# Patient Record
Sex: FEMALE | Race: OTHER | NOT HISPANIC OR LATINO | URBAN - METROPOLITAN AREA
[De-identification: names, ages, dates, MRNs, and addresses within clinical notes are randomized per-mention and may not be internally consistent; named-entity substitution may affect disease eponyms.]

---

## 2018-04-24 ENCOUNTER — OUTPATIENT (OUTPATIENT)
Dept: OUTPATIENT SERVICES | Facility: HOSPITAL | Age: 23
LOS: 1 days | End: 2018-04-24
Payer: MEDICAID

## 2018-04-24 ENCOUNTER — EMERGENCY (EMERGENCY)
Facility: HOSPITAL | Age: 23
LOS: 1 days | Discharge: LEFT BEFORE DISCHARGE | End: 2018-04-24
Attending: EMERGENCY MEDICINE
Payer: MEDICAID

## 2018-04-24 VITALS
OXYGEN SATURATION: 99 % | WEIGHT: 111.99 LBS | HEIGHT: 59 IN | RESPIRATION RATE: 16 BRPM | SYSTOLIC BLOOD PRESSURE: 113 MMHG | HEART RATE: 100 BPM | TEMPERATURE: 99 F | DIASTOLIC BLOOD PRESSURE: 72 MMHG

## 2018-04-24 DIAGNOSIS — O26.899 OTHER SPECIFIED PREGNANCY RELATED CONDITIONS, UNSPECIFIED TRIMESTER: ICD-10-CM

## 2018-04-24 DIAGNOSIS — Z3A.00 WEEKS OF GESTATION OF PREGNANCY NOT SPECIFIED: ICD-10-CM

## 2018-04-24 PROCEDURE — 99281 EMR DPT VST MAYX REQ PHY/QHP: CPT

## 2018-04-24 PROCEDURE — 59025 FETAL NON-STRESS TEST: CPT

## 2018-04-24 PROCEDURE — G0463: CPT

## 2018-04-24 NOTE — ED ADULT NURSE NOTE - OBJECTIVE STATEMENT
22 y/o female with c/o abdominal pain was evaluated in L & D and was sent here for evaluation and clearance pt . looks fairly comfortbale with  no signs of any pain or discomfort.

## 2018-04-24 NOTE — ED PROVIDER NOTE - OBJECTIVE STATEMENT
Pt. present to ED with lower abdominal pain. Pt. was seen at L&D and was then sent back to the ED to be evaluated. Pt. was upset about the wait time. I started to get a history from the patient, but she then decided to get up and leave. I could not convince the patient to stay and be evaluated.

## 2018-04-24 NOTE — ED ADULT NURSE NOTE - NS ED NURSE ELOPE COMMENTS
pt spoke with DR Nunez but after evaluation pt decide to leave and just go home MD  convinced pt to stay but pt refused and just left

## 2018-06-19 PROBLEM — Z00.00 ENCOUNTER FOR PREVENTIVE HEALTH EXAMINATION: Status: ACTIVE | Noted: 2018-06-19

## 2018-06-26 ENCOUNTER — APPOINTMENT (OUTPATIENT)
Dept: CARDIOLOGY | Facility: CLINIC | Age: 23
End: 2018-06-26

## 2018-09-01 ENCOUNTER — OUTPATIENT (OUTPATIENT)
Dept: OUTPATIENT SERVICES | Facility: HOSPITAL | Age: 23
LOS: 1 days | End: 2018-09-01
Payer: MEDICAID

## 2018-09-01 DIAGNOSIS — O26.899 OTHER SPECIFIED PREGNANCY RELATED CONDITIONS, UNSPECIFIED TRIMESTER: ICD-10-CM

## 2018-09-01 DIAGNOSIS — Z3A.00 WEEKS OF GESTATION OF PREGNANCY NOT SPECIFIED: ICD-10-CM

## 2018-09-01 LAB
ALBUMIN SERPL ELPH-MCNC: 2.2 G/DL — LOW (ref 3.5–5)
ALP SERPL-CCNC: 125 U/L — HIGH (ref 40–120)
ALT FLD-CCNC: 13 U/L DA — SIGNIFICANT CHANGE UP (ref 10–60)
ANION GAP SERPL CALC-SCNC: 9 MMOL/L — SIGNIFICANT CHANGE UP (ref 5–17)
AST SERPL-CCNC: 12 U/L — SIGNIFICANT CHANGE UP (ref 10–40)
BILIRUB SERPL-MCNC: 0.3 MG/DL — SIGNIFICANT CHANGE UP (ref 0.2–1.2)
BUN SERPL-MCNC: 9 MG/DL — SIGNIFICANT CHANGE UP (ref 7–18)
CALCIUM SERPL-MCNC: 8.1 MG/DL — LOW (ref 8.4–10.5)
CHLORIDE SERPL-SCNC: 109 MMOL/L — HIGH (ref 96–108)
CO2 SERPL-SCNC: 23 MMOL/L — SIGNIFICANT CHANGE UP (ref 22–31)
CREAT SERPL-MCNC: 0.44 MG/DL — LOW (ref 0.5–1.3)
GLUCOSE SERPL-MCNC: 89 MG/DL — SIGNIFICANT CHANGE UP (ref 70–99)
POTASSIUM SERPL-MCNC: 3.7 MMOL/L — SIGNIFICANT CHANGE UP (ref 3.5–5.3)
POTASSIUM SERPL-SCNC: 3.7 MMOL/L — SIGNIFICANT CHANGE UP (ref 3.5–5.3)
PROT SERPL-MCNC: 6.6 G/DL — SIGNIFICANT CHANGE UP (ref 6–8.3)
SODIUM SERPL-SCNC: 141 MMOL/L — SIGNIFICANT CHANGE UP (ref 135–145)

## 2018-09-01 PROCEDURE — 82239 BILE ACIDS TOTAL: CPT

## 2018-09-01 PROCEDURE — 99281 EMR DPT VST MAYX REQ PHY/QHP: CPT

## 2018-09-01 PROCEDURE — G0463: CPT

## 2018-09-01 PROCEDURE — 59025 FETAL NON-STRESS TEST: CPT

## 2018-09-01 PROCEDURE — 80053 COMPREHEN METABOLIC PANEL: CPT

## 2018-09-05 LAB — BILE AC FLD-MCNC: 5.3 UMOL/L — SIGNIFICANT CHANGE UP (ref 4.7–24.5)

## 2018-09-14 ENCOUNTER — OUTPATIENT (OUTPATIENT)
Dept: OUTPATIENT SERVICES | Facility: HOSPITAL | Age: 23
LOS: 1 days | End: 2018-09-14
Payer: MEDICAID

## 2018-09-14 DIAGNOSIS — Z3A.00 WEEKS OF GESTATION OF PREGNANCY NOT SPECIFIED: ICD-10-CM

## 2018-09-14 DIAGNOSIS — O26.899 OTHER SPECIFIED PREGNANCY RELATED CONDITIONS, UNSPECIFIED TRIMESTER: ICD-10-CM

## 2018-09-14 PROCEDURE — 99282 EMERGENCY DEPT VISIT SF MDM: CPT

## 2018-09-14 PROCEDURE — G0463: CPT

## 2018-09-14 PROCEDURE — 59025 FETAL NON-STRESS TEST: CPT

## 2018-09-15 ENCOUNTER — RESULT REVIEW (OUTPATIENT)
Age: 23
End: 2018-09-15

## 2018-09-15 ENCOUNTER — INPATIENT (INPATIENT)
Facility: HOSPITAL | Age: 23
LOS: 1 days | Discharge: HOME | End: 2018-09-17
Attending: OBSTETRICS & GYNECOLOGY | Admitting: OBSTETRICS & GYNECOLOGY
Payer: MEDICAID

## 2018-09-15 VITALS — WEIGHT: 143.3 LBS

## 2018-09-15 DIAGNOSIS — O26.899 OTHER SPECIFIED PREGNANCY RELATED CONDITIONS, UNSPECIFIED TRIMESTER: ICD-10-CM

## 2018-09-15 DIAGNOSIS — Z3A.00 WEEKS OF GESTATION OF PREGNANCY NOT SPECIFIED: ICD-10-CM

## 2018-09-15 DIAGNOSIS — Z34.80 ENCOUNTER FOR SUPERVISION OF OTHER NORMAL PREGNANCY, UNSPECIFIED TRIMESTER: ICD-10-CM

## 2018-09-15 LAB
APTT BLD: 38.6 SEC — HIGH (ref 27.5–37.4)
BASOPHILS # BLD AUTO: 0.1 K/UL — SIGNIFICANT CHANGE UP (ref 0–0.2)
BASOPHILS NFR BLD AUTO: 0.5 % — SIGNIFICANT CHANGE UP (ref 0–2)
BLD GP AB SCN SERPL QL: SIGNIFICANT CHANGE UP
EOSINOPHIL # BLD AUTO: 0 K/UL — SIGNIFICANT CHANGE UP (ref 0–0.5)
EOSINOPHIL NFR BLD AUTO: 0.1 % — SIGNIFICANT CHANGE UP (ref 0–6)
FIBRINOGEN PPP-MCNC: 936 MG/DL — HIGH (ref 310–510)
HCT VFR BLD CALC: 37.1 % — SIGNIFICANT CHANGE UP (ref 34.5–45)
HGB BLD-MCNC: 11.9 G/DL — SIGNIFICANT CHANGE UP (ref 11.5–15.5)
INR BLD: 1.13 RATIO — SIGNIFICANT CHANGE UP (ref 0.88–1.16)
LYMPHOCYTES # BLD AUTO: 1.6 K/UL — SIGNIFICANT CHANGE UP (ref 1–3.3)
LYMPHOCYTES # BLD AUTO: 11.3 % — LOW (ref 13–44)
MCHC RBC-ENTMCNC: 27.8 PG — SIGNIFICANT CHANGE UP (ref 27–34)
MCHC RBC-ENTMCNC: 32.1 GM/DL — SIGNIFICANT CHANGE UP (ref 32–36)
MCV RBC AUTO: 86.7 FL — SIGNIFICANT CHANGE UP (ref 80–100)
MONOCYTES # BLD AUTO: 0.4 K/UL — SIGNIFICANT CHANGE UP (ref 0–0.9)
MONOCYTES NFR BLD AUTO: 2.4 % — SIGNIFICANT CHANGE UP (ref 2–14)
NEUTROPHILS # BLD AUTO: 12.4 K/UL — HIGH (ref 1.8–7.4)
NEUTROPHILS NFR BLD AUTO: 85.7 % — HIGH (ref 43–77)
PLATELET # BLD AUTO: 323 K/UL — SIGNIFICANT CHANGE UP (ref 150–400)
PROTHROM AB SERPL-ACNC: 12.3 SEC — SIGNIFICANT CHANGE UP (ref 9.8–12.7)
RBC # BLD: 4.28 M/UL — SIGNIFICANT CHANGE UP (ref 3.8–5.2)
RBC # FLD: 12.1 % — SIGNIFICANT CHANGE UP (ref 10.3–14.5)
WBC # BLD: 14.5 K/UL — HIGH (ref 3.8–10.5)
WBC # FLD AUTO: 14.5 K/UL — HIGH (ref 3.8–10.5)

## 2018-09-15 PROCEDURE — 88307 TISSUE EXAM BY PATHOLOGIST: CPT | Mod: 26

## 2018-09-15 RX ORDER — SODIUM CHLORIDE 9 MG/ML
1000 INJECTION, SOLUTION INTRAVENOUS
Qty: 0 | Refills: 0 | Status: DISCONTINUED | OUTPATIENT
Start: 2018-09-15 | End: 2018-09-15

## 2018-09-15 RX ORDER — DIBUCAINE 1 %
1 OINTMENT (GRAM) RECTAL EVERY 4 HOURS
Qty: 0 | Refills: 0 | Status: DISCONTINUED | OUTPATIENT
Start: 2018-09-15 | End: 2018-09-17

## 2018-09-15 RX ORDER — PENICILLIN G POTASSIUM 5000000 [IU]/1
POWDER, FOR SOLUTION INTRAMUSCULAR; INTRAPLEURAL; INTRATHECAL; INTRAVENOUS
Qty: 0 | Refills: 0 | Status: DISCONTINUED | OUTPATIENT
Start: 2018-09-15 | End: 2018-09-15

## 2018-09-15 RX ORDER — LANOLIN
1 OINTMENT (GRAM) TOPICAL EVERY 6 HOURS
Qty: 0 | Refills: 0 | Status: DISCONTINUED | OUTPATIENT
Start: 2018-09-15 | End: 2018-09-17

## 2018-09-15 RX ORDER — PENICILLIN G POTASSIUM 5000000 [IU]/1
2.5 POWDER, FOR SOLUTION INTRAMUSCULAR; INTRAPLEURAL; INTRATHECAL; INTRAVENOUS EVERY 4 HOURS
Qty: 0 | Refills: 0 | Status: DISCONTINUED | OUTPATIENT
Start: 2018-09-15 | End: 2018-09-15

## 2018-09-15 RX ORDER — DIPHENHYDRAMINE HCL 50 MG
25 CAPSULE ORAL EVERY 6 HOURS
Qty: 0 | Refills: 0 | Status: DISCONTINUED | OUTPATIENT
Start: 2018-09-15 | End: 2018-09-17

## 2018-09-15 RX ORDER — MAGNESIUM HYDROXIDE 400 MG/1
30 TABLET, CHEWABLE ORAL
Qty: 0 | Refills: 0 | Status: DISCONTINUED | OUTPATIENT
Start: 2018-09-15 | End: 2018-09-17

## 2018-09-15 RX ORDER — SODIUM CHLORIDE 9 MG/ML
1000 INJECTION, SOLUTION INTRAVENOUS ONCE
Qty: 0 | Refills: 0 | Status: COMPLETED | OUTPATIENT
Start: 2018-09-15 | End: 2018-09-15

## 2018-09-15 RX ORDER — CITRIC ACID/SODIUM CITRATE 300-500 MG
15 SOLUTION, ORAL ORAL EVERY 4 HOURS
Qty: 0 | Refills: 0 | Status: DISCONTINUED | OUTPATIENT
Start: 2018-09-15 | End: 2018-09-15

## 2018-09-15 RX ORDER — INFLUENZA VIRUS VACCINE 15; 15; 15; 15 UG/.5ML; UG/.5ML; UG/.5ML; UG/.5ML
0.5 SUSPENSION INTRAMUSCULAR ONCE
Qty: 0 | Refills: 0 | Status: DISCONTINUED | OUTPATIENT
Start: 2018-09-15 | End: 2018-09-17

## 2018-09-15 RX ORDER — OXYTOCIN 10 UNIT/ML
41.67 VIAL (ML) INJECTION
Qty: 20 | Refills: 0 | Status: DISCONTINUED | OUTPATIENT
Start: 2018-09-15 | End: 2018-09-17

## 2018-09-15 RX ORDER — OXYTOCIN 10 UNIT/ML
333.33 VIAL (ML) INJECTION
Qty: 20 | Refills: 0 | Status: DISCONTINUED | OUTPATIENT
Start: 2018-09-15 | End: 2018-09-15

## 2018-09-15 RX ORDER — AER TRAVELER 0.5 G/1
1 SOLUTION RECTAL; TOPICAL EVERY 4 HOURS
Qty: 0 | Refills: 0 | Status: DISCONTINUED | OUTPATIENT
Start: 2018-09-15 | End: 2018-09-17

## 2018-09-15 RX ORDER — OXYCODONE AND ACETAMINOPHEN 5; 325 MG/1; MG/1
1 TABLET ORAL
Qty: 0 | Refills: 0 | Status: DISCONTINUED | OUTPATIENT
Start: 2018-09-15 | End: 2018-09-17

## 2018-09-15 RX ORDER — DOCUSATE SODIUM 100 MG
100 CAPSULE ORAL
Qty: 0 | Refills: 0 | Status: DISCONTINUED | OUTPATIENT
Start: 2018-09-15 | End: 2018-09-17

## 2018-09-15 RX ORDER — PENICILLIN G POTASSIUM 5000000 [IU]/1
5 POWDER, FOR SOLUTION INTRAMUSCULAR; INTRAPLEURAL; INTRATHECAL; INTRAVENOUS ONCE
Qty: 0 | Refills: 0 | Status: COMPLETED | OUTPATIENT
Start: 2018-09-15 | End: 2018-09-15

## 2018-09-15 RX ORDER — IBUPROFEN 200 MG
600 TABLET ORAL EVERY 6 HOURS
Qty: 0 | Refills: 0 | Status: DISCONTINUED | OUTPATIENT
Start: 2018-09-15 | End: 2018-09-17

## 2018-09-15 RX ORDER — GLYCERIN ADULT
1 SUPPOSITORY, RECTAL RECTAL AT BEDTIME
Qty: 0 | Refills: 0 | Status: DISCONTINUED | OUTPATIENT
Start: 2018-09-15 | End: 2018-09-17

## 2018-09-15 RX ORDER — BENZOCAINE 10 %
1 GEL (GRAM) MUCOUS MEMBRANE EVERY 6 HOURS
Qty: 0 | Refills: 0 | Status: DISCONTINUED | OUTPATIENT
Start: 2018-09-15 | End: 2018-09-17

## 2018-09-15 RX ORDER — TETANUS TOXOID, REDUCED DIPHTHERIA TOXOID AND ACELLULAR PERTUSSIS VACCINE, ADSORBED 5; 2.5; 8; 8; 2.5 [IU]/.5ML; [IU]/.5ML; UG/.5ML; UG/.5ML; UG/.5ML
0.5 SUSPENSION INTRAMUSCULAR ONCE
Qty: 0 | Refills: 0 | Status: COMPLETED | OUTPATIENT
Start: 2018-09-15 | End: 2019-08-14

## 2018-09-15 RX ORDER — HYDROCORTISONE 1 %
1 OINTMENT (GRAM) TOPICAL EVERY 4 HOURS
Qty: 0 | Refills: 0 | Status: DISCONTINUED | OUTPATIENT
Start: 2018-09-15 | End: 2018-09-17

## 2018-09-15 RX ORDER — ACETAMINOPHEN 500 MG
650 TABLET ORAL EVERY 6 HOURS
Qty: 0 | Refills: 0 | Status: DISCONTINUED | OUTPATIENT
Start: 2018-09-15 | End: 2018-09-17

## 2018-09-15 RX ORDER — SODIUM CHLORIDE 9 MG/ML
3 INJECTION INTRAMUSCULAR; INTRAVENOUS; SUBCUTANEOUS EVERY 8 HOURS
Qty: 0 | Refills: 0 | Status: DISCONTINUED | OUTPATIENT
Start: 2018-09-15 | End: 2018-09-17

## 2018-09-15 RX ORDER — SIMETHICONE 80 MG/1
80 TABLET, CHEWABLE ORAL EVERY 6 HOURS
Qty: 0 | Refills: 0 | Status: DISCONTINUED | OUTPATIENT
Start: 2018-09-15 | End: 2018-09-17

## 2018-09-15 RX ORDER — BUTORPHANOL TARTRATE 2 MG/ML
2 INJECTION, SOLUTION INTRAMUSCULAR; INTRAVENOUS ONCE
Qty: 0 | Refills: 0 | Status: DISCONTINUED | OUTPATIENT
Start: 2018-09-15 | End: 2018-09-15

## 2018-09-15 RX ORDER — OXYTOCIN 10 UNIT/ML
2 VIAL (ML) INJECTION
Qty: 30 | Refills: 0 | Status: DISCONTINUED | OUTPATIENT
Start: 2018-09-15 | End: 2018-09-15

## 2018-09-15 RX ORDER — OXYCODONE AND ACETAMINOPHEN 5; 325 MG/1; MG/1
2 TABLET ORAL EVERY 6 HOURS
Qty: 0 | Refills: 0 | Status: DISCONTINUED | OUTPATIENT
Start: 2018-09-15 | End: 2018-09-17

## 2018-09-15 RX ORDER — PRAMOXINE HYDROCHLORIDE 150 MG/15G
1 AEROSOL, FOAM RECTAL EVERY 4 HOURS
Qty: 0 | Refills: 0 | Status: DISCONTINUED | OUTPATIENT
Start: 2018-09-15 | End: 2018-09-17

## 2018-09-15 RX ADMIN — PENICILLIN G POTASSIUM 200 MILLION UNIT(S): 5000000 POWDER, FOR SOLUTION INTRAMUSCULAR; INTRAPLEURAL; INTRATHECAL; INTRAVENOUS at 13:26

## 2018-09-15 RX ADMIN — PENICILLIN G POTASSIUM 200 MILLION UNIT(S): 5000000 POWDER, FOR SOLUTION INTRAMUSCULAR; INTRAPLEURAL; INTRATHECAL; INTRAVENOUS at 04:00

## 2018-09-15 RX ADMIN — BUTORPHANOL TARTRATE 2 MILLIGRAM(S): 2 INJECTION, SOLUTION INTRAMUSCULAR; INTRAVENOUS at 09:55

## 2018-09-15 RX ADMIN — SODIUM CHLORIDE 125 MILLILITER(S): 9 INJECTION, SOLUTION INTRAVENOUS at 12:53

## 2018-09-15 RX ADMIN — SODIUM CHLORIDE 125 MILLILITER(S): 9 INJECTION, SOLUTION INTRAVENOUS at 07:18

## 2018-09-15 RX ADMIN — Medication 125 MILLIUNIT(S)/MIN: at 15:25

## 2018-09-15 RX ADMIN — PENICILLIN G POTASSIUM 200 MILLION UNIT(S): 5000000 POWDER, FOR SOLUTION INTRAMUSCULAR; INTRAPLEURAL; INTRATHECAL; INTRAVENOUS at 08:00

## 2018-09-15 RX ADMIN — SODIUM CHLORIDE 3 MILLILITER(S): 9 INJECTION INTRAMUSCULAR; INTRAVENOUS; SUBCUTANEOUS at 22:57

## 2018-09-15 RX ADMIN — Medication 25 MILLIGRAM(S): at 09:59

## 2018-09-15 RX ADMIN — Medication 2 MILLIUNIT(S)/MIN: at 14:07

## 2018-09-15 RX ADMIN — OXYCODONE AND ACETAMINOPHEN 2 TABLET(S): 5; 325 TABLET ORAL at 17:51

## 2018-09-15 RX ADMIN — BUTORPHANOL TARTRATE 2 MILLIGRAM(S): 2 INJECTION, SOLUTION INTRAMUSCULAR; INTRAVENOUS at 10:11

## 2018-09-15 RX ADMIN — OXYCODONE AND ACETAMINOPHEN 2 TABLET(S): 5; 325 TABLET ORAL at 17:28

## 2018-09-15 RX ADMIN — SODIUM CHLORIDE 2000 MILLILITER(S): 9 INJECTION, SOLUTION INTRAVENOUS at 03:55

## 2018-09-16 ENCOUNTER — TRANSCRIPTION ENCOUNTER (OUTPATIENT)
Age: 23
End: 2018-09-16

## 2018-09-16 DIAGNOSIS — D50.0 IRON DEFICIENCY ANEMIA SECONDARY TO BLOOD LOSS (CHRONIC): ICD-10-CM

## 2018-09-16 LAB
BASOPHILS # BLD AUTO: 0.1 K/UL — SIGNIFICANT CHANGE UP (ref 0–0.2)
BASOPHILS NFR BLD AUTO: 0.7 % — SIGNIFICANT CHANGE UP (ref 0–2)
EOSINOPHIL # BLD AUTO: 0.1 K/UL — SIGNIFICANT CHANGE UP (ref 0–0.5)
EOSINOPHIL NFR BLD AUTO: 0.9 % — SIGNIFICANT CHANGE UP (ref 0–6)
HCT VFR BLD CALC: 32.1 % — LOW (ref 34.5–45)
HGB BLD-MCNC: 10.2 G/DL — LOW (ref 11.5–15.5)
LYMPHOCYTES # BLD AUTO: 22.4 % — SIGNIFICANT CHANGE UP (ref 13–44)
LYMPHOCYTES # BLD AUTO: 3.4 K/UL — HIGH (ref 1–3.3)
MCHC RBC-ENTMCNC: 28 PG — SIGNIFICANT CHANGE UP (ref 27–34)
MCHC RBC-ENTMCNC: 31.9 GM/DL — LOW (ref 32–36)
MCV RBC AUTO: 87.6 FL — SIGNIFICANT CHANGE UP (ref 80–100)
MONOCYTES # BLD AUTO: 0.8 K/UL — SIGNIFICANT CHANGE UP (ref 0–0.9)
MONOCYTES NFR BLD AUTO: 5.1 % — SIGNIFICANT CHANGE UP (ref 2–14)
NEUTROPHILS # BLD AUTO: 10.7 K/UL — HIGH (ref 1.8–7.4)
NEUTROPHILS NFR BLD AUTO: 70.8 % — SIGNIFICANT CHANGE UP (ref 43–77)
PLATELET # BLD AUTO: 274 K/UL — SIGNIFICANT CHANGE UP (ref 150–400)
RBC # BLD: 3.66 M/UL — LOW (ref 3.8–5.2)
RBC # FLD: 12.7 % — SIGNIFICANT CHANGE UP (ref 10.3–14.5)
T PALLIDUM AB TITR SER: NEGATIVE — SIGNIFICANT CHANGE UP
WBC # BLD: 15.1 K/UL — HIGH (ref 3.8–10.5)
WBC # FLD AUTO: 15.1 K/UL — HIGH (ref 3.8–10.5)

## 2018-09-16 RX ORDER — FERROUS SULFATE 325(65) MG
325 TABLET ORAL DAILY
Qty: 0 | Refills: 0 | Status: DISCONTINUED | OUTPATIENT
Start: 2018-09-16 | End: 2018-09-17

## 2018-09-16 RX ORDER — ASCORBIC ACID 60 MG
500 TABLET,CHEWABLE ORAL DAILY
Qty: 0 | Refills: 0 | Status: DISCONTINUED | OUTPATIENT
Start: 2018-09-16 | End: 2018-09-17

## 2018-09-16 RX ADMIN — Medication 600 MILLIGRAM(S): at 12:19

## 2018-09-16 RX ADMIN — Medication 1 TABLET(S): at 12:18

## 2018-09-16 RX ADMIN — SODIUM CHLORIDE 3 MILLILITER(S): 9 INJECTION INTRAMUSCULAR; INTRAVENOUS; SUBCUTANEOUS at 06:46

## 2018-09-16 RX ADMIN — SODIUM CHLORIDE 3 MILLILITER(S): 9 INJECTION INTRAMUSCULAR; INTRAVENOUS; SUBCUTANEOUS at 14:27

## 2018-09-16 RX ADMIN — Medication 600 MILLIGRAM(S): at 01:15

## 2018-09-16 RX ADMIN — Medication 100 MILLIGRAM(S): at 12:19

## 2018-09-16 RX ADMIN — Medication 500 MILLIGRAM(S): at 12:18

## 2018-09-16 RX ADMIN — Medication 600 MILLIGRAM(S): at 13:00

## 2018-09-16 RX ADMIN — Medication 600 MILLIGRAM(S): at 02:15

## 2018-09-16 RX ADMIN — Medication 325 MILLIGRAM(S): at 12:18

## 2018-09-16 NOTE — DISCHARGE NOTE OB - PLAN OF CARE
s/p vaginal delivery , routine postpartum care Pelvic rest for 5-6weeks, no sex, no tampons. Avoid heavy lifting, no strenuous activities. Motrin as needed for pain. Regular diet as tolerated. Follow up in office 5-6 weeks.

## 2018-09-16 NOTE — PROGRESS NOTE ADULT - PROBLEM SELECTOR PLAN 2
A/P:  PPD#1 s/p  w acute blood loss anemia  -Continue pain management  -Encourage OOB and ambulation  -Continue current care  -feso4/vitC/folic acid/pnv  -Plan for discharge tomorrow  -d/w dr Ordonez

## 2018-09-16 NOTE — DISCHARGE NOTE OB - PATIENT PORTAL LINK FT
You can access the [facility] Patient Portal, offered by [facility], by registering with the following website: [email or web address]

## 2018-09-16 NOTE — DISCHARGE NOTE OB - MATERIALS PROVIDED
[facility] Only Screening Program/Birth Certificate Instructions/Bottle Feeding Log/Breastfeeding Guide and Packet/Shaken Baby Prevention Handout/Guide to Postpartum Care/Breastfeeding Log/  Immunization Record

## 2018-09-16 NOTE — DISCHARGE NOTE OB - CARE PROVIDER_API CALL
Jimmy Ordonez (DO), Obstetrics  Gynecology  [address] [place] [address]  Phone: [phone]  Fax: [phone]

## 2018-09-17 VITALS
OXYGEN SATURATION: 99 % | SYSTOLIC BLOOD PRESSURE: 102 MMHG | HEART RATE: 73 BPM | TEMPERATURE: 98 F | RESPIRATION RATE: 16 BRPM | DIASTOLIC BLOOD PRESSURE: 67 MMHG

## 2018-09-17 PROCEDURE — 86850 RBC ANTIBODY SCREEN: CPT

## 2018-09-17 PROCEDURE — 90715 TDAP VACCINE 7 YRS/> IM: CPT

## 2018-09-17 PROCEDURE — 85730 THROMBOPLASTIN TIME PARTIAL: CPT

## 2018-09-17 PROCEDURE — 85027 COMPLETE CBC AUTOMATED: CPT

## 2018-09-17 PROCEDURE — 86901 BLOOD TYPING SEROLOGIC RH(D): CPT

## 2018-09-17 PROCEDURE — 88307 TISSUE EXAM BY PATHOLOGIST: CPT

## 2018-09-17 PROCEDURE — 85384 FIBRINOGEN ACTIVITY: CPT

## 2018-09-17 PROCEDURE — G0463: CPT

## 2018-09-17 PROCEDURE — 59050 FETAL MONITOR W/REPORT: CPT

## 2018-09-17 PROCEDURE — 86900 BLOOD TYPING SEROLOGIC ABO: CPT

## 2018-09-17 PROCEDURE — 86780 TREPONEMA PALLIDUM: CPT

## 2018-09-17 PROCEDURE — 59025 FETAL NON-STRESS TEST: CPT

## 2018-09-17 PROCEDURE — 85610 PROTHROMBIN TIME: CPT

## 2018-09-17 RX ORDER — TETANUS TOXOID, REDUCED DIPHTHERIA TOXOID AND ACELLULAR PERTUSSIS VACCINE, ADSORBED 5; 2.5; 8; 8; 2.5 [IU]/.5ML; [IU]/.5ML; UG/.5ML; UG/.5ML; UG/.5ML
0.5 SUSPENSION INTRAMUSCULAR ONCE
Qty: 0 | Refills: 0 | Status: COMPLETED | OUTPATIENT
Start: 2018-09-17 | End: 2018-09-17

## 2018-09-17 RX ADMIN — Medication 600 MILLIGRAM(S): at 01:09

## 2018-09-17 RX ADMIN — Medication 1 SPRAY(S): at 14:38

## 2018-09-17 RX ADMIN — TETANUS TOXOID, REDUCED DIPHTHERIA TOXOID AND ACELLULAR PERTUSSIS VACCINE, ADSORBED 0.5 MILLILITER(S): 5; 2.5; 8; 8; 2.5 SUSPENSION INTRAMUSCULAR at 05:20

## 2018-09-17 NOTE — PROGRESS NOTE ADULT - SUBJECTIVE AND OBJECTIVE BOX
Patient evaluated at bedside, offers no acute complaints.  Resting comfortably with baby at bedside.  Tolerating regular diet, Voiding without difficulty; +flatus, -BM  Currently breast and bottlefeeding.  Denies fever/chills, nausea/vomiting, headache, dizziness, chest pains, shortness of breath, palpitations    Vital Signs Last 24 Hrs  T(C): 36.7 (17 Sep 2018 06:00), Max: 37.1 (16 Sep 2018 18:00)  T(F): 98 (17 Sep 2018 06:00), Max: 98.7 (16 Sep 2018 18:00)  HR: 73 (17 Sep 2018 06:00) (73 - 96)  BP: 102/67 (17 Sep 2018 06:00) (102/67 - 116/77)  BP(mean): --  RR: 16 (17 Sep 2018 06:00) (16 - 16)  SpO2: 99% (17 Sep 2018 06:00) (99% - 99%)    PE: Pt appears comfortable, NAD, AAOx3  Chest: CTA bilaterally, no W/R/R  Cardiac: RRR  Breasts: soft, NT/nonengorged bilaterally; no masses, no erythema  Abd: soft; NT; no guarding or rebound; +BS x4 quad; Fundus firm NT below umbilicus  Gyn: mild lochia  Ext: soft, NT; DTRs 2+ bilaterally; no worsening edema                          10.2   15.1  )-----------( 274      ( 16 Sep 2018 07:09 )             32.1       d/w Dr. Ordonez
Patient seen at bedside resting comfortably offers no current complaints. Ambulating and voiding without difficulty.  Passing flatus and tolerating regular diet.  both breast/bottle feeding . Denies HA, CP, SOB, N/V/D, dizziness, palpitations,  worsening vaginal bleeding, or any other concerns.      Vital Signs Last 24 Hrs  T(C): 36.7 (16 Sep 2018 06:00), Max: 36.9 (15 Sep 2018 15:37)  T(F): 98 (16 Sep 2018 06:00), Max: 98.4 (15 Sep 2018 15:37)  HR: 91 (16 Sep 2018 06:00) (78 - 113)  BP: 106/72 (16 Sep 2018 06:00) (102/63 - 119/69)  BP(mean): 73 (15 Sep 2018 18:26) (73 - 87)  RR: 16 (16 Sep 2018 06:00) (16 - 18)  SpO2: 99% (16 Sep 2018 06:00) (98% - 100%)    Physical Exam:     Gen: A&Ox 3, NAD  Chest: CTA B/L  Cardiac: S1,S2; RRR  Breast: Soft, nontender, nonengorged  Abdomen: +BS, Soft, nontender, ND; Fundus firm below umbilicus  Gyn: mod lochia  Ext: Nontender, DTRS 2+, no worsening edema                        10.2   15.1  )-----------( 274      ( 16 Sep 2018 07:09 )             32.1     A/P:  PPD#1 s/p  w acute blood loss anemia  -Continue pain management  -Encourage OOB and ambulation  -Continue current care  -feso4/vitC/folic acid/pnv  -Plan for discharge tomorrow  -d/w dr Ordonez

## 2018-09-17 NOTE — PROGRESS NOTE ADULT - PROBLEM SELECTOR PLAN 1
Discharge instructions verbalized  D/C home
A/P:  PPD#1 s/p  w acute blood loss anemia  -Continue pain management  -Encourage OOB and ambulation  -Continue current care  -feso4/vitC/folic acid/pnv  -Plan for discharge tomorrow  -d/w dr Ordonez

## 2019-09-17 NOTE — DISCHARGE NOTE OB - CARE PLAN
no
Normal rate, regular rhythm.  Heart sounds S1, S2.  No murmurs, rubs or gallops.
Principal Discharge DX:	Normal spontaneous vaginal delivery  Goal:	s/p vaginal delivery , routine postpartum care  Assessment and plan of treatment:	Pelvic rest for 5-6weeks, no sex, no tampons. Avoid heavy lifting, no strenuous activities. Motrin as needed for pain. Regular diet as tolerated. Follow up in office 5-6 weeks.

## 2023-03-12 ENCOUNTER — EMERGENCY (EMERGENCY)
Facility: HOSPITAL | Age: 28
LOS: 1 days | Discharge: HOME | End: 2023-03-12
Attending: EMERGENCY MEDICINE
Payer: MEDICAID

## 2023-03-12 VITALS
OXYGEN SATURATION: 99 % | SYSTOLIC BLOOD PRESSURE: 114 MMHG | TEMPERATURE: 98 F | HEART RATE: 91 BPM | DIASTOLIC BLOOD PRESSURE: 73 MMHG | WEIGHT: 134.92 LBS | RESPIRATION RATE: 16 BRPM | HEIGHT: 59 IN

## 2023-03-12 LAB
ALBUMIN SERPL ELPH-MCNC: 2.7 G/DL — LOW (ref 3.5–5)
ALP SERPL-CCNC: 54 U/L — SIGNIFICANT CHANGE UP (ref 40–120)
ALT FLD-CCNC: 29 U/L DA — SIGNIFICANT CHANGE UP (ref 10–60)
ANION GAP SERPL CALC-SCNC: 5 MMOL/L — SIGNIFICANT CHANGE UP (ref 5–17)
APPEARANCE UR: CLEAR — SIGNIFICANT CHANGE UP
AST SERPL-CCNC: 12 U/L — SIGNIFICANT CHANGE UP (ref 10–40)
BACTERIA # UR AUTO: ABNORMAL /HPF
BASOPHILS # BLD AUTO: 0.02 K/UL — SIGNIFICANT CHANGE UP (ref 0–0.2)
BASOPHILS NFR BLD AUTO: 0.2 % — SIGNIFICANT CHANGE UP (ref 0–2)
BILIRUB SERPL-MCNC: 0.3 MG/DL — SIGNIFICANT CHANGE UP (ref 0.2–1.2)
BILIRUB UR-MCNC: NEGATIVE — SIGNIFICANT CHANGE UP
BUN SERPL-MCNC: 7 MG/DL — SIGNIFICANT CHANGE UP (ref 7–18)
CALCIUM SERPL-MCNC: 9 MG/DL — SIGNIFICANT CHANGE UP (ref 8.4–10.5)
CHLORIDE SERPL-SCNC: 106 MMOL/L — SIGNIFICANT CHANGE UP (ref 96–108)
CO2 SERPL-SCNC: 26 MMOL/L — SIGNIFICANT CHANGE UP (ref 22–31)
COLOR SPEC: YELLOW — SIGNIFICANT CHANGE UP
CREAT SERPL-MCNC: 0.66 MG/DL — SIGNIFICANT CHANGE UP (ref 0.5–1.3)
DIFF PNL FLD: ABNORMAL
EGFR: 122 ML/MIN/1.73M2 — SIGNIFICANT CHANGE UP
EOSINOPHIL # BLD AUTO: 0.08 K/UL — SIGNIFICANT CHANGE UP (ref 0–0.5)
EOSINOPHIL NFR BLD AUTO: 1 % — SIGNIFICANT CHANGE UP (ref 0–6)
EPI CELLS # UR: ABNORMAL /HPF
GLUCOSE SERPL-MCNC: 94 MG/DL — SIGNIFICANT CHANGE UP (ref 70–99)
GLUCOSE UR QL: NEGATIVE — SIGNIFICANT CHANGE UP
HCT VFR BLD CALC: 35.3 % — SIGNIFICANT CHANGE UP (ref 34.5–45)
HGB BLD-MCNC: 11.5 G/DL — SIGNIFICANT CHANGE UP (ref 11.5–15.5)
IMM GRANULOCYTES NFR BLD AUTO: 0.4 % — SIGNIFICANT CHANGE UP (ref 0–0.9)
KETONES UR-MCNC: NEGATIVE — SIGNIFICANT CHANGE UP
LEUKOCYTE ESTERASE UR-ACNC: ABNORMAL
LYMPHOCYTES # BLD AUTO: 2.35 K/UL — SIGNIFICANT CHANGE UP (ref 1–3.3)
LYMPHOCYTES # BLD AUTO: 29.2 % — SIGNIFICANT CHANGE UP (ref 13–44)
MCHC RBC-ENTMCNC: 28.9 PG — SIGNIFICANT CHANGE UP (ref 27–34)
MCHC RBC-ENTMCNC: 32.6 GM/DL — SIGNIFICANT CHANGE UP (ref 32–36)
MCV RBC AUTO: 88.7 FL — SIGNIFICANT CHANGE UP (ref 80–100)
MONOCYTES # BLD AUTO: 0.34 K/UL — SIGNIFICANT CHANGE UP (ref 0–0.9)
MONOCYTES NFR BLD AUTO: 4.2 % — SIGNIFICANT CHANGE UP (ref 2–14)
NEUTROPHILS # BLD AUTO: 5.24 K/UL — SIGNIFICANT CHANGE UP (ref 1.8–7.4)
NEUTROPHILS NFR BLD AUTO: 65 % — SIGNIFICANT CHANGE UP (ref 43–77)
NITRITE UR-MCNC: NEGATIVE — SIGNIFICANT CHANGE UP
NRBC # BLD: 0 /100 WBCS — SIGNIFICANT CHANGE UP (ref 0–0)
PH UR: 5 — SIGNIFICANT CHANGE UP (ref 5–8)
PLATELET # BLD AUTO: 299 K/UL — SIGNIFICANT CHANGE UP (ref 150–400)
POTASSIUM SERPL-MCNC: 3.8 MMOL/L — SIGNIFICANT CHANGE UP (ref 3.5–5.3)
POTASSIUM SERPL-SCNC: 3.8 MMOL/L — SIGNIFICANT CHANGE UP (ref 3.5–5.3)
PROT SERPL-MCNC: 7 G/DL — SIGNIFICANT CHANGE UP (ref 6–8.3)
PROT UR-MCNC: NEGATIVE — SIGNIFICANT CHANGE UP
RBC # BLD: 3.98 M/UL — SIGNIFICANT CHANGE UP (ref 3.8–5.2)
RBC # FLD: 11.9 % — SIGNIFICANT CHANGE UP (ref 10.3–14.5)
RBC CASTS # UR COMP ASSIST: ABNORMAL /HPF (ref 0–2)
SODIUM SERPL-SCNC: 137 MMOL/L — SIGNIFICANT CHANGE UP (ref 135–145)
SP GR SPEC: 1.02 — SIGNIFICANT CHANGE UP (ref 1.01–1.02)
UROBILINOGEN FLD QL: NEGATIVE — SIGNIFICANT CHANGE UP
WBC # BLD: 8.06 K/UL — SIGNIFICANT CHANGE UP (ref 3.8–10.5)
WBC # FLD AUTO: 8.06 K/UL — SIGNIFICANT CHANGE UP (ref 3.8–10.5)
WBC UR QL: SIGNIFICANT CHANGE UP /HPF (ref 0–5)

## 2023-03-12 PROCEDURE — 99284 EMERGENCY DEPT VISIT MOD MDM: CPT

## 2023-03-12 PROCEDURE — 80053 COMPREHEN METABOLIC PANEL: CPT

## 2023-03-12 PROCEDURE — 87186 SC STD MICRODIL/AGAR DIL: CPT

## 2023-03-12 PROCEDURE — 36415 COLL VENOUS BLD VENIPUNCTURE: CPT

## 2023-03-12 PROCEDURE — 81001 URINALYSIS AUTO W/SCOPE: CPT

## 2023-03-12 PROCEDURE — 85025 COMPLETE CBC W/AUTO DIFF WBC: CPT

## 2023-03-12 PROCEDURE — 76815 OB US LIMITED FETUS(S): CPT

## 2023-03-12 PROCEDURE — 87086 URINE CULTURE/COLONY COUNT: CPT

## 2023-03-12 PROCEDURE — 76815 OB US LIMITED FETUS(S): CPT | Mod: 26

## 2023-03-12 PROCEDURE — 76802 OB US < 14 WKS ADDL FETUS: CPT

## 2023-03-12 RX ADMIN — Medication 1 APPLICATORFUL: at 21:39

## 2023-03-12 NOTE — ED ADULT NURSE NOTE - OBJECTIVE STATEMENT
14-week pregnant, 26 yo female lying on bed c/o vaginal itching and noted blood clots during urination.

## 2023-03-12 NOTE — ED ADULT TRIAGE NOTE - CHIEF COMPLAINT QUOTE
Patient states " I'm fourteen weeks pregnant, today I was having vaginal itching and when I pee I saw blood and keep wiping I have some clots. "  Denies abdominal pain

## 2023-03-12 NOTE — ED PROVIDER NOTE - CLINICAL SUMMARY MEDICAL DECISION MAKING FREE TEXT BOX
27 yr old female  14 wks pregnant hx of candida of vagina presents to ed c/o vaginal itch and spotting today. no trauma. last sexual intercourse 1 week ago. taking prenatal vitamin. had been on amox for tonsillitis.    pt is A+. no need for rhogam. vag bleed could be from candida infection vs threaten ab no clinical sign of abruptio placenta. no documented hx of cervical incompetency- labs, sono, ua, clotrimazole vag supp. since pt was on amox which can increase risk of yeast infection- pelvic rest

## 2023-03-12 NOTE — ED PROVIDER NOTE - PATIENT PORTAL LINK FT
You can access the FollowMyHealth Patient Portal offered by [facility] by registering at the following website: [email or web address]. By joining [employer]’s FollowMyHealth portal, you will also be able to view your health information using other applications (apps) compatible with our system.

## 2023-03-12 NOTE — ED PROVIDER NOTE - NSFOLLOWUPINSTRUCTIONS_ED_ALL_ED_FT
Please avoid sexual intercourse. monitor bleed and see your OB. see your MD also for the yeast infection after treatment

## 2023-03-12 NOTE — ED PROVIDER NOTE - OBJECTIVE STATEMENT
27 yr old female  14 wks pregnant hx of candida of vagina presents to ed c/o vaginal itch and spotting today. no trauma. last sexual intercourse 1 week ago. taking prenatal vitamin. had been on amox for tonsillitis.

## 2023-03-14 LAB
-  AMIKACIN: SIGNIFICANT CHANGE UP
-  AMOXICILLIN/CLAVULANIC ACID: SIGNIFICANT CHANGE UP
-  AMPICILLIN/SULBACTAM: SIGNIFICANT CHANGE UP
-  AMPICILLIN: SIGNIFICANT CHANGE UP
-  AZTREONAM: SIGNIFICANT CHANGE UP
-  CEFAZOLIN: SIGNIFICANT CHANGE UP
-  CEFEPIME: SIGNIFICANT CHANGE UP
-  CEFTRIAXONE: SIGNIFICANT CHANGE UP
-  CEFUROXIME: SIGNIFICANT CHANGE UP
-  CIPROFLOXACIN: SIGNIFICANT CHANGE UP
-  ERTAPENEM: SIGNIFICANT CHANGE UP
-  GENTAMICIN: SIGNIFICANT CHANGE UP
-  IMIPENEM: SIGNIFICANT CHANGE UP
-  LEVOFLOXACIN: SIGNIFICANT CHANGE UP
-  MEROPENEM: SIGNIFICANT CHANGE UP
-  NITROFURANTOIN: SIGNIFICANT CHANGE UP
-  PIPERACILLIN/TAZOBACTAM: SIGNIFICANT CHANGE UP
-  TOBRAMYCIN: SIGNIFICANT CHANGE UP
-  TRIMETHOPRIM/SULFAMETHOXAZOLE: SIGNIFICANT CHANGE UP
CULTURE RESULTS: SIGNIFICANT CHANGE UP
METHOD TYPE: SIGNIFICANT CHANGE UP
ORGANISM # SPEC MICROSCOPIC CNT: SIGNIFICANT CHANGE UP
ORGANISM # SPEC MICROSCOPIC CNT: SIGNIFICANT CHANGE UP
SPECIMEN SOURCE: SIGNIFICANT CHANGE UP

## 2023-08-31 ENCOUNTER — INPATIENT (INPATIENT)
Facility: HOSPITAL | Age: 28
LOS: 2 days | Discharge: HOME | End: 2023-09-03
Attending: OBSTETRICS & GYNECOLOGY | Admitting: OBSTETRICS & GYNECOLOGY
Payer: MEDICAID

## 2023-08-31 VITALS
RESPIRATION RATE: 18 BRPM | TEMPERATURE: 98 F | DIASTOLIC BLOOD PRESSURE: 63 MMHG | HEART RATE: 104 BPM | OXYGEN SATURATION: 100 % | SYSTOLIC BLOOD PRESSURE: 110 MMHG

## 2023-08-31 DIAGNOSIS — Z34.80 ENCOUNTER FOR SUPERVISION OF OTHER NORMAL PREGNANCY, UNSPECIFIED TRIMESTER: ICD-10-CM

## 2023-08-31 DIAGNOSIS — O26.899 OTHER SPECIFIED PREGNANCY RELATED CONDITIONS, UNSPECIFIED TRIMESTER: ICD-10-CM

## 2023-08-31 DIAGNOSIS — Z3A.00 WEEKS OF GESTATION OF PREGNANCY NOT SPECIFIED: ICD-10-CM

## 2023-08-31 LAB
APTT BLD: 29 SEC — SIGNIFICANT CHANGE UP (ref 24.5–35.6)
BASOPHILS # BLD AUTO: 0.02 K/UL — SIGNIFICANT CHANGE UP (ref 0–0.2)
BASOPHILS NFR BLD AUTO: 0.1 % — SIGNIFICANT CHANGE UP (ref 0–2)
BLD GP AB SCN SERPL QL: SIGNIFICANT CHANGE UP
EOSINOPHIL # BLD AUTO: 0.07 K/UL — SIGNIFICANT CHANGE UP (ref 0–0.5)
EOSINOPHIL NFR BLD AUTO: 0.5 % — SIGNIFICANT CHANGE UP (ref 0–6)
FIBRINOGEN PPP-MCNC: 686 MG/DL — HIGH (ref 200–475)
HCT VFR BLD CALC: 35.9 % — SIGNIFICANT CHANGE UP (ref 34.5–45)
HGB BLD-MCNC: 11.6 G/DL — SIGNIFICANT CHANGE UP (ref 11.5–15.5)
HIV 1 & 2 AB SERPL IA.RAPID: SIGNIFICANT CHANGE UP
IMM GRANULOCYTES NFR BLD AUTO: 0.6 % — SIGNIFICANT CHANGE UP (ref 0–0.9)
INR BLD: 0.95 RATIO — SIGNIFICANT CHANGE UP (ref 0.85–1.18)
LYMPHOCYTES # BLD AUTO: 1.7 K/UL — SIGNIFICANT CHANGE UP (ref 1–3.3)
LYMPHOCYTES # BLD AUTO: 12.5 % — LOW (ref 13–44)
MCHC RBC-ENTMCNC: 27.1 PG — SIGNIFICANT CHANGE UP (ref 27–34)
MCHC RBC-ENTMCNC: 32.3 GM/DL — SIGNIFICANT CHANGE UP (ref 32–36)
MCV RBC AUTO: 83.9 FL — SIGNIFICANT CHANGE UP (ref 80–100)
MONOCYTES # BLD AUTO: 0.39 K/UL — SIGNIFICANT CHANGE UP (ref 0–0.9)
MONOCYTES NFR BLD AUTO: 2.9 % — SIGNIFICANT CHANGE UP (ref 2–14)
NEUTROPHILS # BLD AUTO: 11.32 K/UL — HIGH (ref 1.8–7.4)
NEUTROPHILS NFR BLD AUTO: 83.4 % — HIGH (ref 43–77)
NRBC # BLD: 0 /100 WBCS — SIGNIFICANT CHANGE UP (ref 0–0)
PLATELET # BLD AUTO: 339 K/UL — SIGNIFICANT CHANGE UP (ref 150–400)
PROTHROM AB SERPL-ACNC: 10.8 SEC — SIGNIFICANT CHANGE UP (ref 9.5–13)
RBC # BLD: 4.28 M/UL — SIGNIFICANT CHANGE UP (ref 3.8–5.2)
RBC # FLD: 13.4 % — SIGNIFICANT CHANGE UP (ref 10.3–14.5)
WBC # BLD: 13.58 K/UL — HIGH (ref 3.8–10.5)
WBC # FLD AUTO: 13.58 K/UL — HIGH (ref 3.8–10.5)

## 2023-08-31 RX ORDER — AMPICILLIN TRIHYDRATE 250 MG
1 CAPSULE ORAL EVERY 4 HOURS
Refills: 0 | Status: DISCONTINUED | OUTPATIENT
Start: 2023-08-31 | End: 2023-08-31

## 2023-08-31 RX ORDER — AMPICILLIN TRIHYDRATE 250 MG
2 CAPSULE ORAL ONCE
Refills: 0 | Status: COMPLETED | OUTPATIENT
Start: 2023-08-31 | End: 2023-08-31

## 2023-08-31 RX ORDER — BENZOCAINE 10 %
1 GEL (GRAM) MUCOUS MEMBRANE EVERY 6 HOURS
Refills: 0 | Status: DISCONTINUED | OUTPATIENT
Start: 2023-08-31 | End: 2023-09-03

## 2023-08-31 RX ORDER — SODIUM CHLORIDE 9 MG/ML
3 INJECTION INTRAMUSCULAR; INTRAVENOUS; SUBCUTANEOUS EVERY 8 HOURS
Refills: 0 | Status: DISCONTINUED | OUTPATIENT
Start: 2023-08-31 | End: 2023-09-03

## 2023-08-31 RX ORDER — LANOLIN
1 OINTMENT (GRAM) TOPICAL EVERY 6 HOURS
Refills: 0 | Status: DISCONTINUED | OUTPATIENT
Start: 2023-08-31 | End: 2023-09-03

## 2023-08-31 RX ORDER — KETOROLAC TROMETHAMINE 30 MG/ML
30 SYRINGE (ML) INJECTION ONCE
Refills: 0 | Status: DISCONTINUED | OUTPATIENT
Start: 2023-08-31 | End: 2023-09-03

## 2023-08-31 RX ORDER — MAGNESIUM HYDROXIDE 400 MG/1
30 TABLET, CHEWABLE ORAL
Refills: 0 | Status: DISCONTINUED | OUTPATIENT
Start: 2023-08-31 | End: 2023-09-03

## 2023-08-31 RX ORDER — HYDROCORTISONE 1 %
1 OINTMENT (GRAM) TOPICAL EVERY 6 HOURS
Refills: 0 | Status: DISCONTINUED | OUTPATIENT
Start: 2023-08-31 | End: 2023-09-03

## 2023-08-31 RX ORDER — TETANUS TOXOID, REDUCED DIPHTHERIA TOXOID AND ACELLULAR PERTUSSIS VACCINE, ADSORBED 5; 2.5; 8; 8; 2.5 [IU]/.5ML; [IU]/.5ML; UG/.5ML; UG/.5ML; UG/.5ML
0.5 SUSPENSION INTRAMUSCULAR ONCE
Refills: 0 | Status: DISCONTINUED | OUTPATIENT
Start: 2023-08-31 | End: 2023-09-03

## 2023-08-31 RX ORDER — SIMETHICONE 80 MG/1
80 TABLET, CHEWABLE ORAL EVERY 4 HOURS
Refills: 0 | Status: DISCONTINUED | OUTPATIENT
Start: 2023-08-31 | End: 2023-09-03

## 2023-08-31 RX ORDER — OXYCODONE HYDROCHLORIDE 5 MG/1
5 TABLET ORAL
Refills: 0 | Status: DISCONTINUED | OUTPATIENT
Start: 2023-08-31 | End: 2023-09-03

## 2023-08-31 RX ORDER — AER TRAVELER 0.5 G/1
1 SOLUTION RECTAL; TOPICAL EVERY 4 HOURS
Refills: 0 | Status: DISCONTINUED | OUTPATIENT
Start: 2023-08-31 | End: 2023-09-03

## 2023-08-31 RX ORDER — CITRIC ACID/SODIUM CITRATE 300-500 MG
15 SOLUTION, ORAL ORAL EVERY 6 HOURS
Refills: 0 | Status: DISCONTINUED | OUTPATIENT
Start: 2023-08-31 | End: 2023-08-31

## 2023-08-31 RX ORDER — IBUPROFEN 200 MG
600 TABLET ORAL EVERY 6 HOURS
Refills: 0 | Status: DISCONTINUED | OUTPATIENT
Start: 2023-08-31 | End: 2023-09-03

## 2023-08-31 RX ORDER — OXYTOCIN 10 UNIT/ML
4 VIAL (ML) INJECTION
Qty: 30 | Refills: 0 | Status: DISCONTINUED | OUTPATIENT
Start: 2023-08-31 | End: 2023-08-31

## 2023-08-31 RX ORDER — ACETAMINOPHEN 500 MG
975 TABLET ORAL EVERY 6 HOURS
Refills: 0 | Status: DISCONTINUED | OUTPATIENT
Start: 2023-08-31 | End: 2023-09-03

## 2023-08-31 RX ORDER — CHLORHEXIDINE GLUCONATE 213 G/1000ML
1 SOLUTION TOPICAL DAILY
Refills: 0 | Status: DISCONTINUED | OUTPATIENT
Start: 2023-08-31 | End: 2023-08-31

## 2023-08-31 RX ORDER — OXYTOCIN 10 UNIT/ML
41.67 VIAL (ML) INJECTION
Qty: 20 | Refills: 0 | Status: DISCONTINUED | OUTPATIENT
Start: 2023-08-31 | End: 2023-09-03

## 2023-08-31 RX ORDER — PRAMOXINE HYDROCHLORIDE 150 MG/15G
1 AEROSOL, FOAM RECTAL EVERY 4 HOURS
Refills: 0 | Status: DISCONTINUED | OUTPATIENT
Start: 2023-08-31 | End: 2023-09-03

## 2023-08-31 RX ORDER — DIBUCAINE 1 %
1 OINTMENT (GRAM) RECTAL EVERY 6 HOURS
Refills: 0 | Status: DISCONTINUED | OUTPATIENT
Start: 2023-08-31 | End: 2023-09-03

## 2023-08-31 RX ORDER — DIPHENHYDRAMINE HCL 50 MG
25 CAPSULE ORAL EVERY 6 HOURS
Refills: 0 | Status: DISCONTINUED | OUTPATIENT
Start: 2023-08-31 | End: 2023-09-03

## 2023-08-31 RX ORDER — FERROUS SULFATE 325(65) MG
325 TABLET ORAL DAILY
Refills: 0 | Status: DISCONTINUED | OUTPATIENT
Start: 2023-09-01 | End: 2023-09-03

## 2023-08-31 RX ORDER — SODIUM CHLORIDE 9 MG/ML
1000 INJECTION, SOLUTION INTRAVENOUS
Refills: 0 | Status: DISCONTINUED | OUTPATIENT
Start: 2023-08-31 | End: 2023-08-31

## 2023-08-31 RX ADMIN — Medication 200 GRAM(S): at 21:16

## 2023-08-31 RX ADMIN — Medication 125 MILLIUNIT(S)/MIN: at 23:15

## 2023-08-31 RX ADMIN — SODIUM CHLORIDE 125 MILLILITER(S): 9 INJECTION, SOLUTION INTRAVENOUS at 21:14

## 2023-08-31 RX ADMIN — SODIUM CHLORIDE 125 MILLILITER(S): 9 INJECTION, SOLUTION INTRAVENOUS at 23:02

## 2023-09-01 ENCOUNTER — TRANSCRIPTION ENCOUNTER (OUTPATIENT)
Age: 28
End: 2023-09-01

## 2023-09-01 LAB
BASOPHILS # BLD AUTO: 0.03 K/UL — SIGNIFICANT CHANGE UP (ref 0–0.2)
BASOPHILS NFR BLD AUTO: 0.2 % — SIGNIFICANT CHANGE UP (ref 0–2)
EOSINOPHIL # BLD AUTO: 0.01 K/UL — SIGNIFICANT CHANGE UP (ref 0–0.5)
EOSINOPHIL NFR BLD AUTO: 0.1 % — SIGNIFICANT CHANGE UP (ref 0–6)
HCT VFR BLD CALC: 30.7 % — LOW (ref 34.5–45)
HCT VFR BLD CALC: 32.2 % — LOW (ref 34.5–45)
HGB BLD-MCNC: 10.1 G/DL — LOW (ref 11.5–15.5)
HGB BLD-MCNC: 10.4 G/DL — LOW (ref 11.5–15.5)
HIV 1+2 AB+HIV1 P24 AG SERPL QL IA: SIGNIFICANT CHANGE UP
IMM GRANULOCYTES NFR BLD AUTO: 0.7 % — SIGNIFICANT CHANGE UP (ref 0–0.9)
LYMPHOCYTES # BLD AUTO: 12.1 % — LOW (ref 13–44)
LYMPHOCYTES # BLD AUTO: 2.12 K/UL — SIGNIFICANT CHANGE UP (ref 1–3.3)
MCHC RBC-ENTMCNC: 27.2 PG — SIGNIFICANT CHANGE UP (ref 27–34)
MCHC RBC-ENTMCNC: 27.7 PG — SIGNIFICANT CHANGE UP (ref 27–34)
MCHC RBC-ENTMCNC: 32.3 GM/DL — SIGNIFICANT CHANGE UP (ref 32–36)
MCHC RBC-ENTMCNC: 32.9 GM/DL — SIGNIFICANT CHANGE UP (ref 32–36)
MCV RBC AUTO: 84.1 FL — SIGNIFICANT CHANGE UP (ref 80–100)
MCV RBC AUTO: 84.1 FL — SIGNIFICANT CHANGE UP (ref 80–100)
MONOCYTES # BLD AUTO: 0.81 K/UL — SIGNIFICANT CHANGE UP (ref 0–0.9)
MONOCYTES NFR BLD AUTO: 4.6 % — SIGNIFICANT CHANGE UP (ref 2–14)
NEUTROPHILS # BLD AUTO: 14.39 K/UL — HIGH (ref 1.8–7.4)
NEUTROPHILS NFR BLD AUTO: 82.3 % — HIGH (ref 43–77)
NRBC # BLD: 0 /100 WBCS — SIGNIFICANT CHANGE UP (ref 0–0)
NRBC # BLD: 0 /100 WBCS — SIGNIFICANT CHANGE UP (ref 0–0)
PLATELET # BLD AUTO: 291 K/UL — SIGNIFICANT CHANGE UP (ref 150–400)
PLATELET # BLD AUTO: 300 K/UL — SIGNIFICANT CHANGE UP (ref 150–400)
RBC # BLD: 3.65 M/UL — LOW (ref 3.8–5.2)
RBC # BLD: 3.83 M/UL — SIGNIFICANT CHANGE UP (ref 3.8–5.2)
RBC # FLD: 13.5 % — SIGNIFICANT CHANGE UP (ref 10.3–14.5)
RBC # FLD: 13.5 % — SIGNIFICANT CHANGE UP (ref 10.3–14.5)
T PALLIDUM AB TITR SER: NEGATIVE — SIGNIFICANT CHANGE UP
WBC # BLD: 15.25 K/UL — HIGH (ref 3.8–10.5)
WBC # BLD: 17.48 K/UL — HIGH (ref 3.8–10.5)
WBC # FLD AUTO: 15.25 K/UL — HIGH (ref 3.8–10.5)
WBC # FLD AUTO: 17.48 K/UL — HIGH (ref 3.8–10.5)

## 2023-09-01 RX ADMIN — Medication 600 MILLIGRAM(S): at 23:00

## 2023-09-01 RX ADMIN — Medication 600 MILLIGRAM(S): at 07:00

## 2023-09-01 RX ADMIN — Medication 600 MILLIGRAM(S): at 14:15

## 2023-09-01 RX ADMIN — Medication 975 MILLIGRAM(S): at 02:24

## 2023-09-01 RX ADMIN — Medication 600 MILLIGRAM(S): at 13:24

## 2023-09-01 RX ADMIN — Medication 975 MILLIGRAM(S): at 02:57

## 2023-09-01 RX ADMIN — SODIUM CHLORIDE 3 MILLILITER(S): 9 INJECTION INTRAMUSCULAR; INTRAVENOUS; SUBCUTANEOUS at 06:00

## 2023-09-01 RX ADMIN — Medication 325 MILLIGRAM(S): at 13:23

## 2023-09-01 RX ADMIN — Medication 600 MILLIGRAM(S): at 06:23

## 2023-09-01 RX ADMIN — Medication 1 TABLET(S): at 13:22

## 2023-09-01 RX ADMIN — Medication 600 MILLIGRAM(S): at 22:05

## 2023-09-01 RX ADMIN — SODIUM CHLORIDE 3 MILLILITER(S): 9 INJECTION INTRAMUSCULAR; INTRAVENOUS; SUBCUTANEOUS at 14:03

## 2023-09-01 NOTE — DISCHARGE NOTE OB - CARE PLAN
1 Principal Discharge DX:	Encounter for vaginal delivery  Assessment and plan of treatment:	Post partum care: Nothing in the vagina for 5-6 weeks. No tampons, no tub baths, no douching or sex. Continue taking your prenatal vitamins as long as you are breastfeeding or at least for 6 weeks. Follow up in 5-6 weeks for your post partum check up.  Assessment and plan of treatment:	labial hematoma- continue warm compress to area. no heavy lifting or pushing.   place cushion under pelvis to raise area in bed.   Principal Discharge DX:	Encounter for vaginal delivery  Assessment and plan of treatment:	Post partum care: Nothing in the vagina for 5-6 weeks. No tampons, no tub baths, no douching or sex. Continue taking your prenatal vitamins as long as you are breastfeeding or at least for 6 weeks. Follow up in 5-6 weeks for your post partum check up.  Secondary Diagnosis:	Hematoma  Assessment and plan of treatment:	apply ice pack

## 2023-09-01 NOTE — DISCHARGE NOTE OB - PLAN OF CARE
labial hematoma- continue warm compress to area. no heavy lifting or pushing.   place cushion under pelvis to raise area in bed. Post partum care: Nothing in the vagina for 5-6 weeks. No tampons, no tub baths, no douching or sex. Continue taking your prenatal vitamins as long as you are breastfeeding or at least for 6 weeks. Follow up in 5-6 weeks for your post partum check up. apply ice pack

## 2023-09-01 NOTE — DISCHARGE NOTE OB - CARE PROVIDER_API CALL
Jimmy Ordonez  Obstetrics and Gynecology  [address], [address]  [place] [address]  Phone: [phone]  Fax: [phone]  Established Patient  Follow Up Time: Routine

## 2023-09-01 NOTE — DISCHARGE NOTE OB - MEDICATION SUMMARY - MEDICATIONS TO TAKE
I will START or STAY ON the medications listed below when I get home from the hospital:    ibuprofen 600 mg oral tablet  -- 1 tab(s) by mouth every 6 hours  -- Indication: For pain    Prenatal Multivitamins with Folic Acid 1 mg oral tablet  -- 1 tab(s) by mouth once a day  -- Indication: For vitamins    ferrous sulfate 325 mg (65 mg elemental iron) oral tablet  -- 1 tab(s) by mouth 2 times a day  -- Indication: For anemia    Colace 2-in-1 50 mg-8.6 mg oral tablet  -- 2 tab(s) by mouth once a day  -- Indication: For Stool softener

## 2023-09-01 NOTE — DISCHARGE NOTE OB - HOSPITAL COURSE
Pt admitted  in active labor. Pt had a precipitous vaginal  delivery . Pt developed a left labial hematoma managed with ice packs and pain meds during pot partum care.  Pt was discharged to home

## 2023-09-01 NOTE — DISCHARGE NOTE OB - MATERIALS PROVIDED
[facility] Screening Program/Breastfeeding Mother’s Support Group Information/Guide to Postpartum Care/Back To Sleep Handout/Shaken Baby Prevention Handout/Breastfeeding Guide and Packet/Birth Certificate Instructions/Discharge Medication Information for Patients and Families Pocket Guide/Letter of Medical Neccessity

## 2023-09-01 NOTE — DISCHARGE NOTE OB - PATIENT PORTAL LINK FT
You can access the FollowMyHealth Patient Portal offered by  by registering at the following website: [email or web address]. By joining [employer]’s FollowMyHealth portal, you will also be able to view your health information using other applications (apps) compatible with our system.

## 2023-09-01 NOTE — PATIENT PROFILE OB - FUNCTIONAL ASSESSMENT - DAILY ACTIVITY 1.
Received patient awake and alert, no complaints pain, eating breakfast, denies suicidal ideation, intent or plan, denies hallucinations./improved 4 = No assist / stand by assistance

## 2023-09-01 NOTE — LACTATION INITIAL EVALUATION - LACTATION INTERVENTIONS
Breastfeeding on cue 8-12X/24 hours with diaper count to assess for adequate intake, safe skin to skin and rooming-in encouraged./initiate/review safe skin-to-skin/initiate/review hand expression/initiate/review techniques for position and latch/review techniques to increase milk supply/reviewed components of an effective feeding and at least 8 effective feedings per day required/reviewed importance of monitoring infant diapers, the breastfeeding log, and minimum output each day/reviewed risks of unnecessary formula supplementation/reviewed risks of artificial nipples/reviewed benefits and recommendations for rooming in/reviewed feeding on demand/by cue at least 8 times a day/reviewed indications of inadequate milk transfer that would require supplementation

## 2023-09-01 NOTE — PATIENT PROFILE OB - NS PRO PT RIGHT SUPPORT PERSON
Panel Management Review  Pt is failing on problem list not completed, pt was dx by Dr. ARANA for HTN on OV=08/17/2018 and 12/05/2017, but last OV states elevated BP without dx of HTN, can you please clarify    Patient has the following on his problem list:     Hypertension   Last three blood pressure readings:  BP Readings from Last 3 Encounters:   01/10/19 142/85   12/29/18 131/62   10/09/18 138/74     Blood pressure: FAILED    HTN Guidelines:  Age 18-59 BP range:  Less than 140/90  Age 60-85 with Diabetes:  Less than 140/90  Age 60-85 without Diabetes:  less than 150/90      Composite cancer screening  Chart review shows that this patient is due/due soon for the following None  Summary:    Patient is due/failing the following:   BP CHECK    Action needed:   Routed to provider for review.    Type of outreach:    None, routed to provider for review.    Questions for provider review:    Pt is failing on problem list not completed, pt was dx by Dr. ARANA for HTN on OV=08/17/2018 and 12/05/2017, but last OV states elevated BP without dx of HTN, can you please clarify                                                                                                                                    Mary Ellen Michelle/JAY  Hodges---[facility]       Chart routed to Provider .          
same name as above

## 2023-09-02 RX ORDER — SENNOSIDES/DOCUSATE SODIUM 8.6MG-50MG
2 TABLET ORAL
Qty: 20 | Refills: 0
Start: 2023-09-02 | End: 2023-09-11

## 2023-09-02 RX ORDER — FERROUS SULFATE 325(65) MG
1 TABLET ORAL
Qty: 60 | Refills: 0
Start: 2023-09-02 | End: 2023-10-01

## 2023-09-02 RX ORDER — IBUPROFEN 200 MG
1 TABLET ORAL
Qty: 80 | Refills: 0
Start: 2023-09-02 | End: 2023-09-21

## 2023-09-02 RX ADMIN — Medication 600 MILLIGRAM(S): at 16:30

## 2023-09-02 RX ADMIN — Medication 1 TABLET(S): at 11:24

## 2023-09-02 RX ADMIN — Medication 975 MILLIGRAM(S): at 10:00

## 2023-09-02 RX ADMIN — Medication 600 MILLIGRAM(S): at 05:19

## 2023-09-02 RX ADMIN — Medication 600 MILLIGRAM(S): at 06:19

## 2023-09-02 RX ADMIN — Medication 600 MILLIGRAM(S): at 15:38

## 2023-09-02 RX ADMIN — AER TRAVELER 1 APPLICATION(S): 0.5 SOLUTION RECTAL; TOPICAL at 21:52

## 2023-09-02 RX ADMIN — Medication 975 MILLIGRAM(S): at 09:04

## 2023-09-03 VITALS
RESPIRATION RATE: 17 BRPM | DIASTOLIC BLOOD PRESSURE: 66 MMHG | OXYGEN SATURATION: 98 % | SYSTOLIC BLOOD PRESSURE: 100 MMHG | TEMPERATURE: 99 F | HEART RATE: 82 BPM

## 2023-09-03 DIAGNOSIS — N90.89 OTHER SPECIFIED NONINFLAMMATORY DISORDERS OF VULVA AND PERINEUM: ICD-10-CM

## 2023-09-03 PROCEDURE — 59050 FETAL MONITOR W/REPORT: CPT

## 2023-09-03 PROCEDURE — 85025 COMPLETE CBC W/AUTO DIFF WBC: CPT

## 2023-09-03 PROCEDURE — 86780 TREPONEMA PALLIDUM: CPT

## 2023-09-03 PROCEDURE — 87389 HIV-1 AG W/HIV-1&-2 AB AG IA: CPT

## 2023-09-03 PROCEDURE — 85384 FIBRINOGEN ACTIVITY: CPT

## 2023-09-03 PROCEDURE — 85610 PROTHROMBIN TIME: CPT

## 2023-09-03 PROCEDURE — 36415 COLL VENOUS BLD VENIPUNCTURE: CPT

## 2023-09-03 PROCEDURE — 86850 RBC ANTIBODY SCREEN: CPT

## 2023-09-03 PROCEDURE — 86900 BLOOD TYPING SEROLOGIC ABO: CPT

## 2023-09-03 PROCEDURE — G0463: CPT

## 2023-09-03 PROCEDURE — 59025 FETAL NON-STRESS TEST: CPT

## 2023-09-03 PROCEDURE — 86703 HIV-1/HIV-2 1 RESULT ANTBDY: CPT

## 2023-09-03 PROCEDURE — 86901 BLOOD TYPING SEROLOGIC RH(D): CPT

## 2023-09-03 PROCEDURE — 85730 THROMBOPLASTIN TIME PARTIAL: CPT

## 2023-09-03 PROCEDURE — 85027 COMPLETE CBC AUTOMATED: CPT

## 2023-09-03 RX ADMIN — Medication 325 MILLIGRAM(S): at 12:08

## 2023-09-03 RX ADMIN — Medication 600 MILLIGRAM(S): at 01:55

## 2023-09-03 RX ADMIN — Medication 600 MILLIGRAM(S): at 00:55

## 2023-09-03 RX ADMIN — Medication 1 TABLET(S): at 12:08

## 2023-09-03 RX ADMIN — Medication 975 MILLIGRAM(S): at 16:21

## 2023-09-03 RX ADMIN — Medication 975 MILLIGRAM(S): at 15:33

## 2023-09-03 NOTE — PROGRESS NOTE ADULT - SUBJECTIVE AND OBJECTIVE BOX
Patient seen at bedside resting comfortably c/o discomfort to left labia. Ambulating slowly but voiding without difficulty.  Passing flatus and tolerating regular diet.  both breast/bottle feeding . Denies HA, CP, SOB, N/V/D, dizziness, palpitations, worsening abdominal pain, worsening vaginal bleeding, or any other concerns.    Vital Signs Last 24 Hrs  T(C): 36.8 (01 Sep 2023 11:00), Max: 37.1 (01 Sep 2023 01:05)  T(F): 98.3 (01 Sep 2023 11:00), Max: 98.8 (01 Sep 2023 01:05)  HR: 88 (01 Sep 2023 11:00) (83 - 105)  BP: 100/60 (01 Sep 2023 11:00) (98/60 - 122/81)  BP(mean): --  RR: 17 (01 Sep 2023 11:00) (16 - 18)  SpO2: 98% (01 Sep 2023 11:00) (97% - 100%)    Parameters below as of 01 Sep 2023 11:00  Patient On (Oxygen Delivery Method): room air        Physical Exam:     Gen: A&Ox 3, NAD  Chest: CTA B/L  Cardiac: S1,S2; RRR  Breast: Soft, nontender, nonengorged  Abdomen: +BS, Soft, nontender,  ND; Fundus firm below umbilicus  Gyn: mod lochia, intact/repaired left labia- edematous-semifirm, tender.   Ext: Nontender, DTRS 2+, no worsening edema                          10.4   17.48 )-----------( 291      ( 01 Sep 2023 07:29 )             32.2         
Patient seen at bedside resting comfortably c/o discomfort to left labia  and some pressure to recum when sitting and walking. . Ambulating slowly but voiding without difficulty.  Passing flatus and tolerating regular diet.  both breast/bottle feeding . Denies HA, CP, SOB, N/V/D, dizziness, palpitations, worsening abdominal pain, worsening vaginal bleeding, or any other concerns.    Vital Signs Last 24 Hrs  T(C): 36.8 (01 Sep 2023 11:00), Max: 37.1 (01 Sep 2023 01:05)  T(F): 98.3 (01 Sep 2023 11:00), Max: 98.8 (01 Sep 2023 01:05)  HR: 88 (01 Sep 2023 11:00) (83 - 105)  BP: 100/60 (01 Sep 2023 11:00) (98/60 - 122/81)  BP(mean): --  RR: 17 (01 Sep 2023 11:00) (16 - 18)  SpO2: 98% (01 Sep 2023 11:00) (97% - 100%)    Parameters below as of 01 Sep 2023 11:00  Patient On (Oxygen Delivery Method): room air        Physical Exam:     Gen: A&Ox 3, NAD  Chest: CTA B/L  Cardiac: S1,S2; RRR  Breast: Soft, nontender, nonengorged  Abdomen: +BS, Soft, nontender,  ND; Fundus firm below umbilicus  Gyn: mod lochia, intact/repaired left labia- edematous-- softer and less  tender, some swelling shifted to mons pubis .   Ext: Nontender, DTRS 2+, no worsening edema                          10.4   17.48 )-----------( 291      ( 01 Sep 2023 07:29 )             32.2                           10.1   15.25 )-----------( 300      ( 01 Sep 2023 16:20 )             30.7       
Patient evaluated at bedside, offers no acute complaints.  Resting comfortably with baby at bedside.  Tolerating regular diet, Voiding without difficulty; +flatus, -BM  Currently breast and bottlefeeding.  Denies fever/chills, nausea/vomiting, headache, dizziness, chest pains, shortness of breath, palpitations    Vital Signs Last 24 Hrs  T(C): 37.1 (03 Sep 2023 06:24), Max: 37.1 (03 Sep 2023 06:24)  T(F): 98.7 (03 Sep 2023 06:24), Max: 98.7 (03 Sep 2023 06:24)  HR: 82 (03 Sep 2023 06:24) (82 - 108)  BP: 100/66 (03 Sep 2023 06:24) (99/60 - 101/68)  RR: 17 (03 Sep 2023 06:24) (17 - 19)  SpO2: 98% (03 Sep 2023 06:24) (98% - 98%)    Parameters below as of 03 Sep 2023 06:24  Patient On (Oxygen Delivery Method): room air    PE: Pt appears comfortable, NAD, AAOx3  Chest: CTA bilaterally, no W/R/R  Cardiac: RRR  Breasts: soft, NT/nonengorged bilaterally; no masses, no erythema  Abd: soft; NT; no guarding or rebound; +BS x4 quad; Fundus firm NT below umbilicus  Gyn: mild lochia, left labial swelling improved; mons pubis hematoma decreased in size 2x2; no erythema or tenderness  Ext: soft, NT; DTRs 2+ bilaterally; no worsening edema                          10.1   15.25 )-----------( 300      ( 01 Sep 2023 16:20 )             30.7       Dr. Ordonez at bedside

## 2023-09-03 NOTE — PROGRESS NOTE ADULT - ASSESSMENT
26yo s/p  PPD#3, complicated by labial hematoma, improving
A/P:  PPD#2 s/p   Left labial hematoma    -Continue pain management  -Encourage OOB and ambulation  -Continue current care  -Plan for possible discharge tomorrow  -d/w dr Ibrahim  
A/P:  PPD#1 s/p   Left labial hematoma    -Continue pain management  -Encourage OOB and ambulation  -Check repeat CBC in pm  -Continue current care  -Plan for possible discharge tomorrow  -d/w dr Smith

## 2024-02-15 NOTE — DISCHARGE NOTE OB - TEMPERATURE GREATER THAN 100.0  F ORALLY
Statement Selected Quality 226: Preventive Care And Screening: Tobacco Use: Screening And Cessation Intervention: Patient screened for tobacco use and is an ex/non-smoker Quality 486: Dermatitis - Improvement In Patient-Reported Itch Severity: Itch severity assessment score is reduced by 2 or more points from the initial (index) assessment score to the follow-up visit score Detail Level: Detailed Quality 176: Tuberculosis Screening Prior To First Course Of Biologic And/Or Immune Response Modifier Therapy: Patient receiving first-time biologic and/or immune response modifier therapy, TB Screening Performed and Results Interpreted within 12 months

## 2024-05-21 ENCOUNTER — EMERGENCY (EMERGENCY)
Facility: HOSPITAL | Age: 29
LOS: 1 days | Discharge: HOME | End: 2024-05-21
Attending: EMERGENCY MEDICINE | Admitting: EMERGENCY MEDICINE
Payer: MEDICAID

## 2024-05-21 ENCOUNTER — TRANSCRIPTION ENCOUNTER (OUTPATIENT)
Age: 29
End: 2024-05-21

## 2024-05-21 VITALS
OXYGEN SATURATION: 99 % | HEART RATE: 81 BPM | TEMPERATURE: 98 F | SYSTOLIC BLOOD PRESSURE: 115 MMHG | WEIGHT: 141.98 LBS | DIASTOLIC BLOOD PRESSURE: 81 MMHG | RESPIRATION RATE: 16 BRPM

## 2024-05-21 VITALS
OXYGEN SATURATION: 97 % | SYSTOLIC BLOOD PRESSURE: 109 MMHG | RESPIRATION RATE: 16 BRPM | TEMPERATURE: 98 F | DIASTOLIC BLOOD PRESSURE: 77 MMHG | HEART RATE: 81 BPM

## 2024-05-21 LAB
ADD ON TEST-SPECIMEN IN LAB: SIGNIFICANT CHANGE UP
ALBUMIN SERPL ELPH-MCNC: 4.3 G/DL — SIGNIFICANT CHANGE UP (ref 3.3–5)
ALP SERPL-CCNC: 98 U/L — SIGNIFICANT CHANGE UP (ref 40–120)
ALT FLD-CCNC: 13 U/L — SIGNIFICANT CHANGE UP (ref 4–33)
ANION GAP SERPL CALC-SCNC: 14 MMOL/L — SIGNIFICANT CHANGE UP (ref 7–14)
APPEARANCE UR: ABNORMAL
APTT BLD: 31 SEC — SIGNIFICANT CHANGE UP (ref 24.5–35.6)
AST SERPL-CCNC: 15 U/L — SIGNIFICANT CHANGE UP (ref 4–32)
BACTERIA # UR AUTO: ABNORMAL /HPF
BASOPHILS # BLD AUTO: 0.05 K/UL — SIGNIFICANT CHANGE UP (ref 0–0.2)
BASOPHILS NFR BLD AUTO: 0.4 % — SIGNIFICANT CHANGE UP (ref 0–2)
BILIRUB SERPL-MCNC: 0.5 MG/DL — SIGNIFICANT CHANGE UP (ref 0.2–1.2)
BILIRUB UR-MCNC: NEGATIVE — SIGNIFICANT CHANGE UP
BLD GP AB SCN SERPL QL: NEGATIVE — SIGNIFICANT CHANGE UP
BUN SERPL-MCNC: 10 MG/DL — SIGNIFICANT CHANGE UP (ref 7–23)
CALCIUM SERPL-MCNC: 9.2 MG/DL — SIGNIFICANT CHANGE UP (ref 8.4–10.5)
CAST: 0 /LPF — SIGNIFICANT CHANGE UP (ref 0–4)
CHLORIDE SERPL-SCNC: 104 MMOL/L — SIGNIFICANT CHANGE UP (ref 98–107)
CO2 SERPL-SCNC: 20 MMOL/L — LOW (ref 22–31)
COLOR SPEC: YELLOW — SIGNIFICANT CHANGE UP
CREAT SERPL-MCNC: 0.52 MG/DL — SIGNIFICANT CHANGE UP (ref 0.5–1.3)
DIFF PNL FLD: ABNORMAL
EGFR: 128 ML/MIN/1.73M2 — SIGNIFICANT CHANGE UP
EOSINOPHIL # BLD AUTO: 0.02 K/UL — SIGNIFICANT CHANGE UP (ref 0–0.5)
EOSINOPHIL NFR BLD AUTO: 0.1 % — SIGNIFICANT CHANGE UP (ref 0–6)
GLUCOSE SERPL-MCNC: 101 MG/DL — HIGH (ref 70–99)
GLUCOSE UR QL: NEGATIVE MG/DL — SIGNIFICANT CHANGE UP
HCT VFR BLD CALC: 38.5 % — SIGNIFICANT CHANGE UP (ref 34.5–45)
HGB BLD-MCNC: 12.8 G/DL — SIGNIFICANT CHANGE UP (ref 11.5–15.5)
IANC: 10.89 K/UL — HIGH (ref 1.8–7.4)
IMM GRANULOCYTES NFR BLD AUTO: 0.3 % — SIGNIFICANT CHANGE UP (ref 0–0.9)
INR BLD: 1.05 RATIO — SIGNIFICANT CHANGE UP (ref 0.85–1.18)
KETONES UR-MCNC: NEGATIVE MG/DL — SIGNIFICANT CHANGE UP
LEUKOCYTE ESTERASE UR-ACNC: ABNORMAL
LYMPHOCYTES # BLD AUTO: 17.3 % — SIGNIFICANT CHANGE UP (ref 13–44)
LYMPHOCYTES # BLD AUTO: 2.35 K/UL — SIGNIFICANT CHANGE UP (ref 1–3.3)
MCHC RBC-ENTMCNC: 28 PG — SIGNIFICANT CHANGE UP (ref 27–34)
MCHC RBC-ENTMCNC: 33.2 GM/DL — SIGNIFICANT CHANGE UP (ref 32–36)
MCV RBC AUTO: 84.2 FL — SIGNIFICANT CHANGE UP (ref 80–100)
MONOCYTES # BLD AUTO: 0.24 K/UL — SIGNIFICANT CHANGE UP (ref 0–0.9)
MONOCYTES NFR BLD AUTO: 1.8 % — LOW (ref 2–14)
NEUTROPHILS # BLD AUTO: 10.89 K/UL — HIGH (ref 1.8–7.4)
NEUTROPHILS NFR BLD AUTO: 80.1 % — HIGH (ref 43–77)
NITRITE UR-MCNC: NEGATIVE — SIGNIFICANT CHANGE UP
NRBC # BLD: 0 /100 WBCS — SIGNIFICANT CHANGE UP (ref 0–0)
NRBC # FLD: 0 K/UL — SIGNIFICANT CHANGE UP (ref 0–0)
PH UR: 6 — SIGNIFICANT CHANGE UP (ref 5–8)
PLATELET # BLD AUTO: 362 K/UL — SIGNIFICANT CHANGE UP (ref 150–400)
POTASSIUM SERPL-MCNC: 4.1 MMOL/L — SIGNIFICANT CHANGE UP (ref 3.5–5.3)
POTASSIUM SERPL-SCNC: 4.1 MMOL/L — SIGNIFICANT CHANGE UP (ref 3.5–5.3)
PROT SERPL-MCNC: 7.6 G/DL — SIGNIFICANT CHANGE UP (ref 6–8.3)
PROT UR-MCNC: SIGNIFICANT CHANGE UP MG/DL
PROTHROM AB SERPL-ACNC: 11.7 SEC — SIGNIFICANT CHANGE UP (ref 9.5–13)
RBC # BLD: 4.57 M/UL — SIGNIFICANT CHANGE UP (ref 3.8–5.2)
RBC # FLD: 12.6 % — SIGNIFICANT CHANGE UP (ref 10.3–14.5)
RBC CASTS # UR COMP ASSIST: 7 /HPF — HIGH (ref 0–4)
RH IG SCN BLD-IMP: POSITIVE — SIGNIFICANT CHANGE UP
SODIUM SERPL-SCNC: 138 MMOL/L — SIGNIFICANT CHANGE UP (ref 135–145)
SP GR SPEC: 1.02 — SIGNIFICANT CHANGE UP (ref 1–1.03)
SQUAMOUS # UR AUTO: 13 /HPF — HIGH (ref 0–5)
UROBILINOGEN FLD QL: 0.2 MG/DL — SIGNIFICANT CHANGE UP (ref 0.2–1)
WBC # BLD: 13.59 K/UL — HIGH (ref 3.8–10.5)
WBC # FLD AUTO: 13.59 K/UL — HIGH (ref 3.8–10.5)
WBC UR QL: 6 /HPF — HIGH (ref 0–5)

## 2024-05-21 PROCEDURE — 74177 CT ABD & PELVIS W/CONTRAST: CPT | Mod: 26,MC

## 2024-05-21 PROCEDURE — 99285 EMERGENCY DEPT VISIT HI MDM: CPT

## 2024-05-21 PROCEDURE — 76830 TRANSVAGINAL US NON-OB: CPT | Mod: 26

## 2024-05-21 RX ORDER — SODIUM CHLORIDE 9 MG/ML
1000 INJECTION INTRAMUSCULAR; INTRAVENOUS; SUBCUTANEOUS ONCE
Refills: 0 | Status: COMPLETED | OUTPATIENT
Start: 2024-05-21 | End: 2024-05-21

## 2024-05-21 RX ORDER — ACETAMINOPHEN 500 MG
1000 TABLET ORAL ONCE
Refills: 0 | Status: COMPLETED | OUTPATIENT
Start: 2024-05-21 | End: 2024-05-21

## 2024-05-21 RX ORDER — CEFTRIAXONE 500 MG/1
1000 INJECTION, POWDER, FOR SOLUTION INTRAMUSCULAR; INTRAVENOUS ONCE
Refills: 0 | Status: COMPLETED | OUTPATIENT
Start: 2024-05-21 | End: 2024-05-21

## 2024-05-21 RX ADMIN — Medication 400 MILLIGRAM(S): at 17:50

## 2024-05-21 RX ADMIN — SODIUM CHLORIDE 1000 MILLILITER(S): 9 INJECTION INTRAMUSCULAR; INTRAVENOUS; SUBCUTANEOUS at 21:08

## 2024-05-21 RX ADMIN — CEFTRIAXONE 100 MILLIGRAM(S): 500 INJECTION, POWDER, FOR SOLUTION INTRAMUSCULAR; INTRAVENOUS at 21:08

## 2024-05-21 NOTE — ED PROVIDER NOTE - ATTENDING CONTRIBUTION TO CARE
GEN - NAD; well appearing; A+O x3   HEAD - NC/AT   EYES- PERRL, EOMI  ENT: Airway patent, mmm, Oral cavity and pharynx normal. No inflammation, swelling, exudate, or lesions.  NECK: Neck supple  PULMONARY - CTA b/l, symmetric breath sounds.   CARDIAC -s1s2, RRR, no M,G,R  ABDOMEN - +BS, ND, mild rlq/suprapubic ttp, soft, no guarding, no rebound, no masses    as above  BACK - no CVA tenderness, Normal  spine   EXTREMITIES - FROM, symmetric pulses, capillary refill < 2 seconds, no edema   SKIN - no rash or bruising   NEUROLOGIC - alert, speech clear, no focal deficits  PSYCH -nl mood/affect, nl insight.  ABd pain/nausea/nb emesis/diarrhea (nonbloody) x few d, vag bleeding today-with lmp 5/17. Hx as above. No fevers, cp, sob, dizziness, dysuria, hematuria. given hx and exam will check labs, ua, cx tvus/ct ap-hydrate/pain ctrl prn-patient agreeable to plan.

## 2024-05-21 NOTE — ED PROVIDER NOTE - OBJECTIVE STATEMENT
29-year-old female with PCOS, endometriosis, presenting with vaginal bleeding and abd pain  Pt stated having nausea and emesis for 2 days and diarrhea for 3 days. Pt states abd pain is in the RLQ and feels as if its coming from her ovary. Pt gave birth 8 months ago and LMP was May 17. In Feb pt had a blighted ovum. Pt endorses vaginal bleeding starting at 1 pm with pt using 2 pads before ED visit. 29-year-old female with PCOS, endometriosis, presenting with nausea/vomiting/diarrhea/abd pain/vag bleding.  Pt stated having nausea and emesis for 2 days and diarrhea for 3 days. Pt states abd pain is in the RLQ and feels as if its coming from her ovary. Pt gave birth 8 months ago and LMP was May 17. In Feb pt had a blighted ovum. Pt endorses vaginal bleeding starting at 1 pm with pt using 2 pads before ED visit.

## 2024-05-21 NOTE — ED PROVIDER NOTE - NSFOLLOWUPINSTRUCTIONS_ED_ALL_ED_FT
You were seen in the emergency department and were found to have a ruptured ovarian cyst.   There are no needs for any acute interventions. We would like for you to follow up with your OBGYN for further care.    For pain you can take tylenol and or ibuprofen    You can use 400-600mg Ibuprofen (such as motrin or advil) every 6 to 8 hours as needed for pain control.  Take ibuprofen with food or milk to lessen stomach upset.  This is an over-the-counter medication please respect the warnings on the label. All medications come with certain risks and side effects that you need to discuss with your doctor, especially if you are taking them for a prolonged period.     You can use 500-1000mg Tylenol every 6 hours for pain - as needed.  This is an over-the-counter medications - please respect the warnings on the label. This medication come with certain risks and side effects that you need to discuss with your doctor, especially if you are taking it for a prolonged period.     Ruptured Ovarian Cyst    WHAT YOU NEED TO KNOW:    A ruptured ovarian cyst is a cyst that breaks open. A cyst is a sac that grows on an ovary. This sac usually contains fluid, but may sometimes have blood or tissue in it. A large cyst that ruptures may lead to problems that need immediate care. It is important to follow up with your healthcare provider to make sure your cyst went away completely with treatment.    DISCHARGE INSTRUCTIONS:    Call 911 for any of the following:    You are too weak or dizzy to stand up.    Return to the emergency department if:    You have severe pain in your pelvis or in your abdomen.    You have pain along with a fever, nausea, or vomiting.    You have signs of shock from blood loss, such as dizziness, cold or clammy skin, or fast breathing.  Contact your healthcare provider if:    You notice changes in your monthly periods, or you begin to have nausea or vomiting with your periods.    You have new or worsening symptoms.    Your pain does not get better with pain medicine.    You have pain during sex.    You have bleeding from your vagina that is not your period.    Your abdomen is swollen, or you have a full or heavy feeling in your lower abdomen.    You have trouble urinating.    You have questions or concerns about your condition or care.  Medicines: You may need any of the following:    NSAIDs, such as ibuprofen, help decrease swelling, pain, and fever. This medicine is available with or without a doctor's order. NSAIDs can cause stomach bleeding or kidney problems in certain people. If you take blood thinner medicine, always ask if NSAIDs are safe for you. Always read the medicine label and follow directions. Do not give these medicines to children younger than 6 months without direction from a healthcare provider.    Antibiotics may be given to prevent or fight an infection caused by bacteria.    Take your medicine as directed. Contact your healthcare provider if you think your medicine is not helping or if you have side effects. Tell your provider if you are allergic to any medicine. Keep a list of the medicines, vitamins, and herbs you take. Include the amounts, and when and why you take them. Bring the list or the pill bottles to follow-up visits. Carry your medicine list with you in case of an emergency.  Manage or prevent a ruptured ovarian cyst:    Apply heat where you have pain, as directed. Heat can help relieve mild pain. Use a heating pad (set on low) or hot water bottle. Wrap the pad or bottle in a towel before you apply it to your skin. Apply heat for 20 minutes every hour, or as directed. A warm bath may also help relieve the pain.    Ask when to come in for a follow-up examination. You may need another ultrasound 6 weeks after your cyst was treated. This will help make sure the cyst is no longer growing or causing health problems. You may also need ultrasound tests for 2 or 3 monthly periods to see how hormones affect your ovaries.    Ask about birth control pills. These may help reduce your risk for cysts. Ask your healthcare provider if birth control pills are right for you. The risk for a blood clot is higher if you take birth control pills, especially if you are older than 35 or smoke.    Have a pelvic exam every year. This may also be called a well woman visit. The exam will include a Pap smear to check for certain cancers. Your healthcare provider will also press on your abdomen to check for lumps or other problems. A pelvic exam can help find problems early. This makes treatment easier and more effective. Tell your healthcare provider if you notice any changes in your monthly periods. Examples include periods that start on a different day than usual, or are lighter or heavier than usual. Tell your provider if you have worse pain than usual, or if the pain is different than you had before.  Follow up with your doctor as directed: Write down your questions so you remember to ask them during your visits. As we discussed your findings are concerning for a possible ruptured ectopic pregnancy.  Please see your OB/GYN IMMEDIATELY with the results from today  Your labs and imaging results have been attached    You can always return back to this ER for any reason.

## 2024-05-21 NOTE — ED PROVIDER NOTE - CARE PLAN
Principal Discharge DX:	Abdominal pain   1 Principal Discharge DX:	Abdominal pain  Secondary Diagnosis:	Ruptured ovarian cyst   Principal Discharge DX:	Abdominal pain  Secondary Diagnosis:	Free fluid in pelvis

## 2024-05-21 NOTE — ED PROVIDER NOTE - PROGRESS NOTE DETAILS
LINN Krueger at [facility] OBGYN consulted stating pt can be seen by OB no acute concerns Capo: prior to dc; uhcg was performed and was positive;  explained to pt that uhcg needed to be performed before CT and she understands; explained now possibility of ruptured ectopic given findings; OB consulted; serum hcg added on Capo: had long extensive talk with pt expressing my concern about ruptured ectopic; pt is admittedly concerned given initially told it was a cyst which she has had in the past; at present does not want to get operated on by OB.  Ob resident and attending now at bedside speaking with pt Capo: OB attending spoke with pt; pt has agreed to get a repeat CBC: her stated goal is to still leave and go to [facility] where her OB practices; pt is hemodynamically stable

## 2024-05-21 NOTE — ED ADULT TRIAGE NOTE - CHIEF COMPLAINT QUOTE
C/O right pelvic pain, N/V, diarrhea since yesterday and heavy vaginal bleed that started 1 hour prior to coming in to ED. States went through 2 pads already. Patient currently taking Norethindrone x2 months for breast feeding. Hx of ovarian cysts.

## 2024-05-21 NOTE — ED PROVIDER NOTE - CLINICAL SUMMARY MEDICAL DECISION MAKING FREE TEXT BOX
Pt concerning for appendicitis, ovarian torsion, ovarian cyst, UTI, nephrolithiasis, cyst rupture will obtain CT abd, TVUS, basic labs, UA, UC,

## 2024-05-21 NOTE — ED PROVIDER NOTE - PATIENT PORTAL LINK FT
You can access the FollowMyHealth Patient Portal offered by [facility] by registering at the following website: [email or web address]. By joining [employer]’s FollowMyHealth portal, you will also be able to view your health information using other applications (apps) compatible with our system. You can access the FollowMyHealth Patient Portal offered by [facility] by registering at the following website: [email or web address]. By joining [employer]’s FollowMyHealth portal, you will also be able to view your health information using other applications (apps) compatible with our system.

## 2024-05-21 NOTE — ED PROVIDER NOTE - PHYSICAL EXAMINATION
GENERAL: NAD, lying in bed comfortably  HEAD:  Atraumatic, normocephalic  HEART: Regular rate and rhythm, no murmurs, rubs, or gallops  LUNGS: Unlabored respirations.  Clear to auscultation bilaterally, no crackles, wheezing, or rhonchi  ABDOMEN: Soft, RLQ and suprapubic tenderness, nondistended, +BS  : Chaperone Mildred Valverde MD- serous vaginal discharge, minimal blood in vaginal vault no pooling no blood or discharge from cervical OS, no cervical motion tenderness, postive right and left adnexal tenderness  EXTREMITIES: 2+ peripheral pulses bilaterally. No clubbing, cyanosis, or edema  NERVOUS SYSTEM:  A&Ox3, moving all extremities, no focal deficits   SKIN: No rashes or lesions

## 2024-05-21 NOTE — ED ADULT NURSE NOTE - OBJECTIVE STATEMENT
Pt received ot intake. pt is AxO 3 and ambulatory. Pt endorses heavy menstrual cycle for the second time post giving birth 8 months ago. pt endorses lower back discomfort. Pt endorses hx of ovarian cysts. pt respirations even and unlabored. Abdomen is soft, nontender, and nondistended. pt denies headaches, dizziness, n/v/d, abdominal pain, SOB, chest pain, or fever like symptoms. pt has a 20G to the left AC. Pt labs obtained and sent to the lab. pt medicated as prescribed. pt plan of care ongoing.

## 2024-05-22 ENCOUNTER — TRANSCRIPTION ENCOUNTER (OUTPATIENT)
Age: 29
End: 2024-05-22

## 2024-05-22 ENCOUNTER — INPATIENT (INPATIENT)
Facility: HOSPITAL | Age: 29
LOS: 0 days | Discharge: HOME | DRG: 833 | End: 2024-05-23
Attending: OBSTETRICS & GYNECOLOGY | Admitting: OBSTETRICS & GYNECOLOGY
Payer: MEDICAID

## 2024-05-22 VITALS
DIASTOLIC BLOOD PRESSURE: 96 MMHG | HEART RATE: 86 BPM | HEIGHT: 61 IN | OXYGEN SATURATION: 100 % | TEMPERATURE: 98 F | RESPIRATION RATE: 17 BRPM | WEIGHT: 141.98 LBS | SYSTOLIC BLOOD PRESSURE: 153 MMHG

## 2024-05-22 DIAGNOSIS — O00.90 UNSPECIFIED ECTOPIC PREGNANCY WITHOUT INTRAUTERINE PREGNANCY: ICD-10-CM

## 2024-05-22 DIAGNOSIS — I49.3 VENTRICULAR PREMATURE DEPOLARIZATION: ICD-10-CM

## 2024-05-22 DIAGNOSIS — R10.9 UNSPECIFIED ABDOMINAL PAIN: ICD-10-CM

## 2024-05-22 LAB
ALBUMIN SERPL ELPH-MCNC: 3.5 G/DL — SIGNIFICANT CHANGE UP (ref 3.5–5)
ALP SERPL-CCNC: 95 U/L — SIGNIFICANT CHANGE UP (ref 40–120)
ALT FLD-CCNC: 20 U/L DA — SIGNIFICANT CHANGE UP (ref 10–60)
ANION GAP SERPL CALC-SCNC: 3 MMOL/L — LOW (ref 5–17)
APTT BLD: 31.1 SEC — SIGNIFICANT CHANGE UP (ref 24.5–35.6)
AST SERPL-CCNC: 9 U/L — LOW (ref 10–40)
BASOPHILS # BLD AUTO: 0.04 K/UL — SIGNIFICANT CHANGE UP (ref 0–0.2)
BASOPHILS # BLD AUTO: 0.04 K/UL — SIGNIFICANT CHANGE UP (ref 0–0.2)
BASOPHILS NFR BLD AUTO: 0.4 % — SIGNIFICANT CHANGE UP (ref 0–2)
BASOPHILS NFR BLD AUTO: 0.4 % — SIGNIFICANT CHANGE UP (ref 0–2)
BILIRUB SERPL-MCNC: 0.5 MG/DL — SIGNIFICANT CHANGE UP (ref 0.2–1.2)
BLD GP AB SCN SERPL QL: SIGNIFICANT CHANGE UP
BUN SERPL-MCNC: 7 MG/DL — SIGNIFICANT CHANGE UP (ref 7–18)
CALCIUM SERPL-MCNC: 9 MG/DL — SIGNIFICANT CHANGE UP (ref 8.4–10.5)
CHLORIDE SERPL-SCNC: 105 MMOL/L — SIGNIFICANT CHANGE UP (ref 96–108)
CO2 SERPL-SCNC: 31 MMOL/L — SIGNIFICANT CHANGE UP (ref 22–31)
CREAT SERPL-MCNC: 0.69 MG/DL — SIGNIFICANT CHANGE UP (ref 0.5–1.3)
CULTURE RESULTS: SIGNIFICANT CHANGE UP
EGFR: 120 ML/MIN/1.73M2 — SIGNIFICANT CHANGE UP
EOSINOPHIL # BLD AUTO: 0.07 K/UL — SIGNIFICANT CHANGE UP (ref 0–0.5)
EOSINOPHIL # BLD AUTO: 0.09 K/UL — SIGNIFICANT CHANGE UP (ref 0–0.5)
EOSINOPHIL NFR BLD AUTO: 0.7 % — SIGNIFICANT CHANGE UP (ref 0–6)
EOSINOPHIL NFR BLD AUTO: 1 % — SIGNIFICANT CHANGE UP (ref 0–6)
GLUCOSE SERPL-MCNC: 83 MG/DL — SIGNIFICANT CHANGE UP (ref 70–99)
HCG SERPL-ACNC: 342 MIU/ML — HIGH
HCT VFR BLD CALC: 39.2 % — SIGNIFICANT CHANGE UP (ref 34.5–45)
HCT VFR BLD CALC: 39.4 % — SIGNIFICANT CHANGE UP (ref 34.5–45)
HGB BLD-MCNC: 12.6 G/DL — SIGNIFICANT CHANGE UP (ref 11.5–15.5)
HGB BLD-MCNC: 13.2 G/DL — SIGNIFICANT CHANGE UP (ref 11.5–15.5)
IANC: 6.44 K/UL — SIGNIFICANT CHANGE UP (ref 1.8–7.4)
IMM GRANULOCYTES NFR BLD AUTO: 0.4 % — SIGNIFICANT CHANGE UP (ref 0–0.9)
IMM GRANULOCYTES NFR BLD AUTO: 0.4 % — SIGNIFICANT CHANGE UP (ref 0–0.9)
INR BLD: 1.11 RATIO — SIGNIFICANT CHANGE UP (ref 0.85–1.18)
LYMPHOCYTES # BLD AUTO: 3.05 K/UL — SIGNIFICANT CHANGE UP (ref 1–3.3)
LYMPHOCYTES # BLD AUTO: 3.67 K/UL — HIGH (ref 1–3.3)
LYMPHOCYTES # BLD AUTO: 34 % — SIGNIFICANT CHANGE UP (ref 13–44)
LYMPHOCYTES # BLD AUTO: 34.7 % — SIGNIFICANT CHANGE UP (ref 13–44)
MCHC RBC-ENTMCNC: 28.2 PG — SIGNIFICANT CHANGE UP (ref 27–34)
MCHC RBC-ENTMCNC: 28.2 PG — SIGNIFICANT CHANGE UP (ref 27–34)
MCHC RBC-ENTMCNC: 32.1 GM/DL — SIGNIFICANT CHANGE UP (ref 32–36)
MCHC RBC-ENTMCNC: 33.5 GM/DL — SIGNIFICANT CHANGE UP (ref 32–36)
MCV RBC AUTO: 84.2 FL — SIGNIFICANT CHANGE UP (ref 80–100)
MCV RBC AUTO: 87.7 FL — SIGNIFICANT CHANGE UP (ref 80–100)
MONOCYTES # BLD AUTO: 0.33 K/UL — SIGNIFICANT CHANGE UP (ref 0–0.9)
MONOCYTES # BLD AUTO: 0.35 K/UL — SIGNIFICANT CHANGE UP (ref 0–0.9)
MONOCYTES NFR BLD AUTO: 3.1 % — SIGNIFICANT CHANGE UP (ref 2–14)
MONOCYTES NFR BLD AUTO: 3.9 % — SIGNIFICANT CHANGE UP (ref 2–14)
NEUTROPHILS # BLD AUTO: 5.41 K/UL — SIGNIFICANT CHANGE UP (ref 1.8–7.4)
NEUTROPHILS # BLD AUTO: 6.44 K/UL — SIGNIFICANT CHANGE UP (ref 1.8–7.4)
NEUTROPHILS NFR BLD AUTO: 60.3 % — SIGNIFICANT CHANGE UP (ref 43–77)
NEUTROPHILS NFR BLD AUTO: 60.7 % — SIGNIFICANT CHANGE UP (ref 43–77)
NRBC # BLD: 0 /100 WBCS — SIGNIFICANT CHANGE UP (ref 0–0)
NRBC # BLD: 0 /100 WBCS — SIGNIFICANT CHANGE UP (ref 0–0)
NRBC # FLD: 0 K/UL — SIGNIFICANT CHANGE UP (ref 0–0)
PLATELET # BLD AUTO: 355 K/UL — SIGNIFICANT CHANGE UP (ref 150–400)
PLATELET # BLD AUTO: 366 K/UL — SIGNIFICANT CHANGE UP (ref 150–400)
POTASSIUM SERPL-MCNC: 3.2 MMOL/L — LOW (ref 3.5–5.3)
POTASSIUM SERPL-SCNC: 3.2 MMOL/L — LOW (ref 3.5–5.3)
PROT SERPL-MCNC: 7.5 G/DL — SIGNIFICANT CHANGE UP (ref 6–8.3)
PROTHROM AB SERPL-ACNC: 12.6 SEC — SIGNIFICANT CHANGE UP (ref 9.5–13)
RBC # BLD: 4.47 M/UL — SIGNIFICANT CHANGE UP (ref 3.8–5.2)
RBC # BLD: 4.68 M/UL — SIGNIFICANT CHANGE UP (ref 3.8–5.2)
RBC # FLD: 12.4 % — SIGNIFICANT CHANGE UP (ref 10.3–14.5)
RBC # FLD: 12.8 % — SIGNIFICANT CHANGE UP (ref 10.3–14.5)
SODIUM SERPL-SCNC: 139 MMOL/L — SIGNIFICANT CHANGE UP (ref 135–145)
SPECIMEN SOURCE: SIGNIFICANT CHANGE UP
TROPONIN I, HIGH SENSITIVITY RESULT: <3 NG/L — SIGNIFICANT CHANGE UP
WBC # BLD: 10.59 K/UL — HIGH (ref 3.8–10.5)
WBC # BLD: 8.98 K/UL — SIGNIFICANT CHANGE UP (ref 3.8–10.5)
WBC # FLD AUTO: 10.59 K/UL — HIGH (ref 3.8–10.5)
WBC # FLD AUTO: 8.98 K/UL — SIGNIFICANT CHANGE UP (ref 3.8–10.5)

## 2024-05-22 PROCEDURE — 76817 TRANSVAGINAL US OBSTETRIC: CPT | Mod: 26

## 2024-05-22 PROCEDURE — 99223 1ST HOSP IP/OBS HIGH 75: CPT

## 2024-05-22 PROCEDURE — 58120 DILATION AND CURETTAGE: CPT | Mod: 1L,AS

## 2024-05-22 PROCEDURE — 88305 TISSUE EXAM BY PATHOLOGIST: CPT | Mod: 26

## 2024-05-22 PROCEDURE — 99284 EMERGENCY DEPT VISIT MOD MDM: CPT | Mod: GC

## 2024-05-22 PROCEDURE — 49320 DIAG LAPARO SEPARATE PROC: CPT | Mod: 1L,AS

## 2024-05-22 PROCEDURE — 99291 CRITICAL CARE FIRST HOUR: CPT

## 2024-05-22 PROCEDURE — 76801 OB US < 14 WKS SINGLE FETUS: CPT | Mod: 26

## 2024-05-22 RX ORDER — KETOROLAC TROMETHAMINE 30 MG/ML
30 SYRINGE (ML) INJECTION ONCE
Refills: 0 | Status: DISCONTINUED | OUTPATIENT
Start: 2024-05-22 | End: 2024-05-23

## 2024-05-22 RX ORDER — HYDROMORPHONE HYDROCHLORIDE 2 MG/ML
0.5 INJECTION INTRAMUSCULAR; INTRAVENOUS; SUBCUTANEOUS
Refills: 0 | Status: DISCONTINUED | OUTPATIENT
Start: 2024-05-22 | End: 2024-05-23

## 2024-05-22 RX ORDER — FENTANYL CITRATE 50 UG/ML
25 INJECTION INTRAVENOUS
Refills: 0 | Status: DISCONTINUED | OUTPATIENT
Start: 2024-05-22 | End: 2024-05-23

## 2024-05-22 RX ORDER — IBUPROFEN 200 MG
1 TABLET ORAL
Qty: 28 | Refills: 0
Start: 2024-05-22 | End: 2024-05-28

## 2024-05-22 RX ORDER — ACETAMINOPHEN 500 MG
1000 TABLET ORAL ONCE
Refills: 0 | Status: DISCONTINUED | OUTPATIENT
Start: 2024-05-22 | End: 2024-05-23

## 2024-05-22 RX ORDER — ONDANSETRON 8 MG/1
8 TABLET, FILM COATED ORAL EVERY 8 HOURS
Refills: 0 | Status: DISCONTINUED | OUTPATIENT
Start: 2024-05-22 | End: 2024-05-23

## 2024-05-22 RX ORDER — ACETAMINOPHEN 500 MG
975 TABLET ORAL ONCE
Refills: 0 | Status: DISCONTINUED | OUTPATIENT
Start: 2024-05-22 | End: 2024-05-23

## 2024-05-22 RX ORDER — SODIUM CHLORIDE 9 MG/ML
1000 INJECTION, SOLUTION INTRAVENOUS
Refills: 0 | Status: DISCONTINUED | OUTPATIENT
Start: 2024-05-22 | End: 2024-05-23

## 2024-05-22 RX ORDER — SIMETHICONE 80 MG/1
80 TABLET, CHEWABLE ORAL EVERY 6 HOURS
Refills: 0 | Status: DISCONTINUED | OUTPATIENT
Start: 2024-05-22 | End: 2024-05-23

## 2024-05-22 RX ADMIN — SODIUM CHLORIDE 125 MILLILITER(S): 9 INJECTION, SOLUTION INTRAVENOUS at 23:18

## 2024-05-22 NOTE — CONSULT NOTE ADULT - ASSESSMENT
INCOMPLETE NOTE      - f/u repeat CBC 29 yof here with RLQ pain and vaginal bleeding, beta-hCG is 319.4, and TVUS reveals complex free fluid in R adnexa. Spoke with radiology team, and they cannot rule out ruptured ectopic. However, patient does also have cysts in R ovary which may be cause of pain and complex fluid seen on imaging. Patient appears comfortable, and vitals are stable. Patient does not want surgery at this time and would like to sign out AMA to get second opinion from her OBGYN.     Plan:   - repeat CBC to ensure patient is not actively bleeding into abdomen        ADDENDUM:             13.2   10.59 )-----------( 366      ( 05-22 @ 01:38 )             39.4                12.8   13.59 )-----------( 362      ( 05-21 @ 18:03 )             38.5     Repeat CBC is stable. Patient continues to endorse minimal pain. The risks of leaving AMA with possible ruptured ectopic (including death) were explained to patient and partner. She expressed understanding and still would not like to stay for surgery.   All questions answered to apparent satisfaction.      Seen with Dr. Gianluca Avila, PGY2

## 2024-05-22 NOTE — CONSULT NOTE ADULT - SUBJECTIVE AND OBJECTIVE BOX
30yo F PMH pf gallstones, comes in for D. and c after she went to [facility] ed yesterday due to acute onset abd pain and found to have ruptured ectopic pregnancy based on the findings on the sonogram. Today she is s/p lap right salpingectomy, D and C as well.  Found to have post op PVCs, medicine consulted for PVCs           pmh: gallstones    psxh: d&c    meds: minipill    allergies: bactrim/ceftin: hives    sh: denies cigs/etoh/drug use           PAST MEDICAL & SURGICAL HISTORY:  No pertinent past medical history        REVIEW OF SYSTEMS:    CONSTITUTIONAL: No weakness, fevers or chills  EYES/ENT: No visual changes;  No vertigo or throat pain   NECK: No pain or stiffness  RESPIRATORY: No cough, wheezing, hemoptysis; No shortness of breath  CARDIOVASCULAR: No chest pain or palpitations  GASTROINTESTINAL: No abdominal or epigastric pain. No nausea, vomiting, or hematemesis; No diarrhea or constipation. No melena or hematochezia.  GENITOURINARY: No dysuria, frequency or hematuria  NEUROLOGICAL: No numbness or weakness  SKIN: No itching, rashes      T(C): 37.4 (05-22-24 @ 22:11), Max: 37.4 (05-22-24 @ 22:11)  HR: 82 (05-22-24 @ 22:56) (75 - 98)  BP: 108/65 (05-22-24 @ 22:56) (107/64 - 153/96)  RR: 14 (05-22-24 @ 22:56) (14 - 23)  SpO2: 100% (05-22-24 @ 22:56) (97% - 100%)  Wt(kg): --Vital Signs Last 24 Hrs  T(C): 37.4 (22 May 2024 22:11), Max: 37.4 (22 May 2024 22:11)  T(F): 99.3 (22 May 2024 22:11), Max: 99.3 (22 May 2024 22:11)  HR: 82 (22 May 2024 22:56) (75 - 98)  BP: 108/65 (22 May 2024 22:56) (107/64 - 153/96)  BP(mean): 76 (22 May 2024 22:56) (76 - 83)  RR: 14 (22 May 2024 22:56) (14 - 23)  SpO2: 100% (22 May 2024 22:56) (97% - 100%)    Parameters below as of 22 May 2024 22:56  Patient On (Oxygen Delivery Method): room air        PHYSICAL EXAM:  GENERAL: NAD, lying in bed comfortably  HEAD:  Atraumatic, Normocephalic  EYES: EOMI, PERRLA, conjunctiva and sclera clear  ENT: Moist mucous membranes  NECK: Supple, No JVD  CHEST/LUNG: Clear to auscultation bilaterally; No rales, rhonchi, wheezing, or rubs. Unlabored respirations  HEART: Regular rate and rhythm; No murmurs, rubs, or gallops  ABDOMEN: Bowel sounds present; Soft, Nontender, Nondistended. No hepatomegally  EXTREMITIES:  2+ Peripheral Pulses, brisk capillary refill. No clubbing, cyanosis, or edema  NERVOUS SYSTEM:  Alert & Oriented X3, speech clear. No deficits   MSK: FROM all 4 extremities, full and equal strength  SKIN: No rashes or lesions    Consultant(s) Notes Reviewed:  [x ] YES  [ ] NO  Care Discussed with Consultants/Other Providers [ x] YES  [ ] NO    LABS:  CBC   05-22-24 @ 13:55  Hematcorit 39.2  Hemoglobin 12.6  Mean Cell Hemoglobin 28.2  Platelet Count-Automated 355  RBC Count 4.47  Red Cell Distrib Width 12.4  Wbc Count 8.98  05-22-24 @ 01:38  Hematcorit 39.4  Hemoglobin 13.2  Mean Cell Hemoglobin 28.2  Platelet Count-Automated 366  RBC Count 4.68  Red Cell Distrib Width 12.8  Wbc Count 10.59      BMP  05-22-24 @ 13:55  Anion Gap. Serum 3  Blood Urea Nitrogen,Serm 7  Calcium, Total Serum 9.0  Carbon Dioxide, Serum 31  Chloride, Serum 105  Creatinine, Serum 0.69  eGFR in  --  eGFR in Non Afican American --  Gloucose, serum 83  Potassium, Serum 3.2  Sodium, Serum 139              05-21-24 @ 18:03  Anion Gap. Serum 14  Blood Urea Nitrogen,Serm 10  Calcium, Total Serum 9.2  Carbon Dioxide, Serum 20  Chloride, Serum 104  Creatinine, Serum 0.52  eGFR in  --  eGFR in Non Afican American --  Gloucose, serum 101  Potassium, Serum 4.1  Sodium, Serum 138                  CMP  05-22-24 @ 13:55  Racheal Aminotransferase(ALT/SGPT)20  Albumin, Serum 3.5  Alkaline Phosphatase, Serum 95  Anion Gap, Serum 3  Aspartate Aminotransferase (AST/SGOT)9  Bilirubin Total, Serum 0.5  Blood Urea Nitrogen, Serum 7  Calcium,Total Serum 9.0  Carbon Dioxide, Serum 31  Chloride, Serum 105  Creatinine, Serum 0.69  eGFR if  --  eGFR if Non African American --  Glucose, Serum 83  Potassium, Serum 3.2  Protein Total, Serum 7.5  Sodium, Serum 139                      05-21-24 @ 18:03  Racheal Aminotransferase(ALT/SGPT)13  Albumin, Serum 4.3  Alkaline Phosphatase, Serum 98  Anion Gap, Serum 14  Aspartate Aminotransferase (AST/SGOT)15  Bilirubin Total, Serum 0.5  Blood Urea Nitrogen, Serum 10  Calcium,Total Serum 9.2  Carbon Dioxide, Serum 20  Chloride, Serum 104  Creatinine, Serum 0.52  eGFR if  --  eGFR if Non African American --  Glucose, Serum 101  Potassium, Serum 4.1  Protein Total, Serum 7.6  Sodium, Serum 138                          PT/INR  PT/INR  05-22-24 @ 13:55  INR 1.11  Prothrombin Time Comment --  Prothrobin Time, [id number]  PT/INR  05-21-24 @ 18:03  INR 1.05  Prothrombin Time Comment --  Prothrobin Time, [id number]      Amylase/Lipase            RADIOLOGY & ADDITIONAL TESTS:    Imaging Personally Reviewed:  [ ] YES  [ ] NO 28yo F PMH pf gallstones, comes in for D. and c after she went to [facility] ed yesterday due to acute onset abd pain and found to have ruptured ectopic pregnancy based on the findings on the sonogram. Today she is s/p lap right salpingectomy, D and C as well.  Found to have post op PVCs, medicine consulted for PVCs     She states she has palpitations chronic      pmh: gallstones    psxh: d&c    meds: minipill    allergies: bactrim/ceftin: hives    sh: denies cigs/etoh/drug use           PAST MEDICAL & SURGICAL HISTORY:  No pertinent past medical history        REVIEW OF SYSTEMS:    CONSTITUTIONAL: No weakness, fevers or chills  EYES/ENT: No visual changes;  No vertigo or throat pain   NECK: No pain or stiffness  RESPIRATORY: No cough, wheezing, hemoptysis; No shortness of breath  CARDIOVASCULAR: No chest pain or palpitations  GASTROINTESTINAL: No abdominal or epigastric pain. No nausea, vomiting, or hematemesis; No diarrhea or constipation. No melena or hematochezia.  GENITOURINARY: No dysuria, frequency or hematuria  NEUROLOGICAL: No numbness or weakness  SKIN: No itching, rashes      T(C): 37.4 (05-22-24 @ 22:11), Max: 37.4 (05-22-24 @ 22:11)  HR: 82 (05-22-24 @ 22:56) (75 - 98)  BP: 108/65 (05-22-24 @ 22:56) (107/64 - 153/96)  RR: 14 (05-22-24 @ 22:56) (14 - 23)  SpO2: 100% (05-22-24 @ 22:56) (97% - 100%)  Wt(kg): --Vital Signs Last 24 Hrs  T(C): 37.4 (22 May 2024 22:11), Max: 37.4 (22 May 2024 22:11)  T(F): 99.3 (22 May 2024 22:11), Max: 99.3 (22 May 2024 22:11)  HR: 82 (22 May 2024 22:56) (75 - 98)  BP: 108/65 (22 May 2024 22:56) (107/64 - 153/96)  BP(mean): 76 (22 May 2024 22:56) (76 - 83)  RR: 14 (22 May 2024 22:56) (14 - 23)  SpO2: 100% (22 May 2024 22:56) (97% - 100%)    Parameters below as of 22 May 2024 22:56  Patient On (Oxygen Delivery Method): room air        PHYSICAL EXAM:  GENERAL: NAD, lying in bed comfortably  HEAD:  Atraumatic, Normocephalic  EYES: EOMI, PERRLA, conjunctiva and sclera clear  ENT: Moist mucous membranes  NECK: Supple, No JVD  CHEST/LUNG: Clear to auscultation bilaterally; No rales, rhonchi, wheezing, or rubs. Unlabored respirations  HEART: Regular rate and rhythm; No murmurs, rubs, or gallops  ABDOMEN: Bowel sounds present; Soft, Nontender, Nondistended. No hepatomegally  EXTREMITIES:  2+ Peripheral Pulses, brisk capillary refill. No clubbing, cyanosis, or edema  NERVOUS SYSTEM:  Alert & Oriented X3, speech clear. No deficits   MSK: FROM all 4 extremities, full and equal strength  SKIN: No rashes or lesions    Consultant(s) Notes Reviewed:  [x ] YES  [ ] NO  Care Discussed with Consultants/Other Providers [ x] YES  [ ] NO    LABS:  CBC   05-22-24 @ 13:55  Hematcorit 39.2  Hemoglobin 12.6  Mean Cell Hemoglobin 28.2  Platelet Count-Automated 355  RBC Count 4.47  Red Cell Distrib Width 12.4  Wbc Count 8.98  05-22-24 @ 01:38  Hematcorit 39.4  Hemoglobin 13.2  Mean Cell Hemoglobin 28.2  Platelet Count-Automated 366  RBC Count 4.68  Red Cell Distrib Width 12.8  Wbc Count 10.59      BMP  05-22-24 @ 13:55  Anion Gap. Serum 3  Blood Urea Nitrogen,Serm 7  Calcium, Total Serum 9.0  Carbon Dioxide, Serum 31  Chloride, Serum 105  Creatinine, Serum 0.69  eGFR in  --  eGFR in Non Afican American --  Gloucose, serum 83  Potassium, Serum 3.2  Sodium, Serum 139              05-21-24 @ 18:03  Anion Gap. Serum 14  Blood Urea Nitrogen,Serm 10  Calcium, Total Serum 9.2  Carbon Dioxide, Serum 20  Chloride, Serum 104  Creatinine, Serum 0.52  eGFR in  --  eGFR in Non Afican American --  Gloucose, serum 101  Potassium, Serum 4.1  Sodium, Serum 138                  CMP  05-22-24 @ 13:55  Racheal Aminotransferase(ALT/SGPT)20  Albumin, Serum 3.5  Alkaline Phosphatase, Serum 95  Anion Gap, Serum 3  Aspartate Aminotransferase (AST/SGOT)9  Bilirubin Total, Serum 0.5  Blood Urea Nitrogen, Serum 7  Calcium,Total Serum 9.0  Carbon Dioxide, Serum 31  Chloride, Serum 105  Creatinine, Serum 0.69  eGFR if  --  eGFR if Non African American --  Glucose, Serum 83  Potassium, Serum 3.2  Protein Total, Serum 7.5  Sodium, Serum 139                      05-21-24 @ 18:03  Racheal Aminotransferase(ALT/SGPT)13  Albumin, Serum 4.3  Alkaline Phosphatase, Serum 98  Anion Gap, Serum 14  Aspartate Aminotransferase (AST/SGOT)15  Bilirubin Total, Serum 0.5  Blood Urea Nitrogen, Serum 10  Calcium,Total Serum 9.2  Carbon Dioxide, Serum 20  Chloride, Serum 104  Creatinine, Serum 0.52  eGFR if  --  eGFR if Non African American --  Glucose, Serum 101  Potassium, Serum 4.1  Protein Total, Serum 7.6  Sodium, Serum 138                          PT/INR  PT/INR  05-22-24 @ 13:55  INR 1.11  Prothrombin Time Comment --  Prothrobin Time, Nedqat18.6  PT/INR  05-21-24 @ 18:03  INR 1.05  Prothrombin Time Comment --  Prothrobin Time, [id number]      Amylase/Lipase            RADIOLOGY & ADDITIONAL TESTS:    Imaging Personally Reviewed:  [ ] YES  [ ] NO

## 2024-05-22 NOTE — H&P ADULT - REASON FOR ADMISSION
[facility] was diagnosed ectopic pregnancy in [facility] 5/21/24

## 2024-05-22 NOTE — DISCHARGE NOTE PROVIDER - NSDCFUADDINST_GEN_ALL_CORE_FT
Pelvic rest,  no sexual intercourse and nothing in vagina ( ie tampons). Motrin as needed for pain. No strenuous activity and  no heavy lifting. Keep incision clean and dry. Follow up with Dr Ordonez 1 weeks for your post op visit

## 2024-05-22 NOTE — CONSULT NOTE ADULT - PROBLEM SELECTOR RECOMMENDATION 9
She has no symptoms  obtain 12 lead and trop  monitor on tele for frequence  jaime hunter: consider bblocker  rec cardiology consult chronic palpitations  States two months ago she passed out   obtained 12 lead no PVCs  and trop-ve  monitor on tele for frequency  bigem, trigem: consider bbpuja  rec cardiology consult chronic palpitations  States two months ago she passed out   obtained 12 lead no PVCs  and trop-ve    -monitor on tele for frequency  -bigem, trigem: consider bblocker  -rec cardiology consult  -Obtain Echo

## 2024-05-22 NOTE — ED PROVIDER NOTE - CLINICAL SUMMARY MEDICAL DECISION MAKING FREE TEXT BOX
Patient with abdominal pain ultrasound suggestive of possible ruptured ectopic.  Labs unremarkable unchanged from yesterday.  GYN consulted.  Will admit for laparoscopy.

## 2024-05-22 NOTE — BRIEF OPERATIVE NOTE - NSICDXBRIEFPROCEDURE_GEN_ALL_CORE_FT
PROCEDURES:  Laparoscopic right salpingectomy for ectopic pregnancy 22-May-2024 22:21:10  Yovana Vieyra  Dilation and curettage, cervix 22-May-2024 22:22:38  Yovana Vieyra

## 2024-05-22 NOTE — H&P ADULT - NSHPLABSRESULTS_GEN_ALL_CORE
12.6   8.98  )-----------( 355      ( 22 May 2024 13:55 )             39.2       05-22    139  |  105  |  7   ----------------------------<  83  3.2<L>   |  31  |  0.69    Ca    9.0      22 May 2024 13:55    TPro  7.5  /  Alb  3.5  /  TBili  0.5  /  DBili  x   /  AST  9<L>  /  ALT  20  /  AlkPhos  95  05-22      bhcg 345     t&s: A+    < from: US Transvaginal, OB (05.22.24 @ 14:40) >    LMP: 05/17/2024 as reported by patient.    COMPARISON: CT abdomen/pelvis 5/21/2024. Pelvic ultrasound 5/21/2024.    Endovaginal and transabdominal pelvic sonogram. Color and Spectral   Doppler was performed.    FINDINGS:  Uterus: 7.0 x 5.8 x 4.0 cm.  Endometrial thickness approximates 6 mm.    Right ovary: 2.3 cm x 3.3 cm x 2.9 cm. Within normal limits. Doppler   evaluation of the right ovary is not performed by the sonographer.  Right adnexa: Adjacent to the right ovarian parenchyma there are 2   slightly hyperechoic rounded echogenic foci, adjacent to one another   measuring 1.9 x 1.4 x 1.2 cm and 1.7 x 1.4 x 1.7 cm, similar in   appearance to prior examination 5/21/2024, indeterminate in etiology.  Left ovary: 3.8 cm x 1.9 cm x 1.8 cm. Cystic structures within the left   ovarian parenchyma measuring up to 1.7 cm. Blood flow identified within   the left ovarian parenchyma.    Fluid: A small volume of free fluid is identified within the pelvic   cul-de-sac region, with debris.    IMPRESSION:  Neither an intrauterine nor definite extrauterine gestation is   identified. This represents a pregnancy of indeterminate location.   Differential diagnosis includes early normal IUP, pregnancy failure or   ectopic pregnancy. Adjacent to the right ovarian parenchyma there are 2   slightly hyperechoic rounded echogenic foci, adjacent to one another   measuring 1.9 x 1.4 x 1.2 cm and 1.7 x 1.4 x 1.7 cm, similar in   appearance to prior examination 5/21/2024, indeterminate in etiology. A   small volume of free fluid is identified within the pelvic cul-de-sac   region, with debris. Serial hCG and ultrasound are recommended to   determine the significance of these findings.

## 2024-05-22 NOTE — DISCHARGE NOTE PROVIDER - CARE PROVIDER_API CALL
Jimmy Ordonez  Obstetrics and Gynecology  [address] [facility], [facility]  [place] [address]  Phone: [phone]  Fax: [phone]  Established Patient  Follow Up Time: 1 week    Yovana Vieyra  Obstetrics and Gynecology  [address] [place] [address]  Phone: [phone]  Fax: [phone]  Follow Up Time:    Jimmy Ordonez  Obstetrics and Gynecology  [address] [facility]  [place] [address]  Phone: [phone]  Fax: [phone]  Established Patient  Follow Up Time: 1 week    Yovana Vieyra  Obstetrics and Gynecology  [address] [place] [address]  Phone: [phone]  Fax: [phone]  Follow Up Time:     Giselle Pineda  Cardiology  [address] [place] [address]  Phone: [phone]  Fax: [phone]  Follow Up Time: 1 week

## 2024-05-22 NOTE — ED ADULT TRIAGE NOTE - CHIEF COMPLAINT QUOTE
right lower abdominal pain since yesterday, R/o ectopic pregnancy. found out she was pregnant yesterday. LMP 17th May.

## 2024-05-22 NOTE — CHART NOTE - NSCHARTNOTEFT_GEN_A_CORE
Attempted to call patient to follow up regarding ED visit overnight in which patient declined surgery for suspected ruptured ectopic pregnancy. Attempted to call number on file in chart, answering service states phone number is not accepting calls at this time. GYN team to continue to follow up.    ALEXIS Holman

## 2024-05-22 NOTE — H&P ADULT - ASSESSMENT
.28yo F  LMP:      [facility]    r/o ectopic pregnancy- for surgical diagnostic laparoscopy; possible salpingectomy; possible ov cystectomy; d&c    - pt verbalized understanding and signed informed consent    - npo since 11:30am: ate piece pizza and matcha latte    - iv fluids    -2 units on hold    - add on to OR

## 2024-05-22 NOTE — DISCHARGE NOTE PROVIDER - NSDCACTIVITY_GEN_ALL_CORE
Showering allowed/Stairs allowed/Walking - Indoors allowed/No heavy lifting/straining/Walking - Outdoors allowed/Follow Instructions Provided by your Surgical Team/Activity as tolerated

## 2024-05-22 NOTE — CONSULT NOTE ADULT - ASSESSMENT
30yo F PMH pf gallstones, comes in for ectopic pregnancy, now  s/p lap right salpingectomy, D and C as well. Found to have post op PVCs, medicine consulted for PVCs

## 2024-05-22 NOTE — ED ADULT NURSE NOTE - OBJECTIVE STATEMENT
Patient presented to the ED complaining of possible ectopic pregnancy. As per patient, patient was at another hospital yesterday for Right lower quadrant abdominal pain and was told that she has a ovarian cyst that burst. Then she was told that she might be pregnant. Patient came in today because her GYN is at [facility] and told her to come in.

## 2024-05-22 NOTE — CONSULT NOTE ADULT - REASON FOR ADMISSION
[facility] was diagnosed ectopic pregnancy in [facility] 5/21/24

## 2024-05-22 NOTE — ED PROVIDER NOTE - OBJECTIVE STATEMENT
Patient presents with intermittent right-sided abdominal pain.  She was at [facility] yesterday with severe pain which has since resolved.  At the time the ultrasound showed complex pelvic fluid and cystic structure and there was concern for possible ruptured ectopic and patient was advised to undergo laparoscopy.  At that time patient declined and opted to follow-up with her GYN.  Today she is here for similar complaints states pain is somewhat less.  Patient was found to be pregnant in the ED yesterday.

## 2024-05-22 NOTE — DISCHARGE NOTE PROVIDER - NSDCMRMEDTOKEN_GEN_ALL_CORE_FT
ibuprofen 600 mg oral tablet: 1 tab(s) orally every 6 hours as needed for  moderate pain   Colace 2-in-1 50 mg-8.6 mg oral tablet: 2 tab(s) orally once a day  ibuprofen 600 mg oral tablet: 1 tab(s) orally every 6 hours as needed for  moderate pain  Tylenol Extra Strength 500 mg oral tablet: 2 tab(s) orally every 6 hours as needed for  moderate pain

## 2024-05-22 NOTE — ED ADULT NURSE REASSESSMENT NOTE - NS ED NURSE REASSESS COMMENT FT1
Received handoff report from RN Irwin, Pt  A &O x3, Pt ambulatory, Pt denies pain, Pt NPO, 20 g left AC, no acute distress, pt admitted to OBGYN, awaiting bed assignment

## 2024-05-22 NOTE — CONSULT NOTE ADULT - ATTENDING COMMENTS
Pt seen and evaluated with PGY-2 immediately after GYN was paged for consult.  Pt p/w N/V/D/ abdominal pain and VB.  Pt had USN ~6:30 pm which showed "complex fluid adjacent to the right ovary, may be compatible with a ruptured cyst although no cyst is seen at this time".  At the time when USN was performed POC was not performed and bhcg was not ordered.     Initial labs ~6pm did not have order for bhcg, add on bhcg performed ~10pm after urine Hcg was noted to be positive.  This was when the GYN team was first consulted.  I immediately went to evaluate the pt with PGY-2.    PGY-2 called the radiology resident to inform team of +bhcg as read was performed prior to this knowledge.  The resident escalated to covering attending who stated they would addend the report to say "cannot rule out ectopic pregnancy".      At time of evaluation pt sitting at edge of bed in NAD.  Pt stated that her RLQ pain was only "1.5/10" after receiving Tylenol.  On exam abdomen was soft, minimal tenderness to palpation in RLQ, no rebound, no guarding, VSS.  Pt with no si/sx of acute blood loss anemia.  Pt appeared frustrated stating that she has been in the ED for "many hours" before being told she was pregnant.  Pt states she is hungry and tired and she plans to go home and call Dr. Ordonez in the morning.      I discussed USN and CT findings in detail with pt and partner.  I discussed +FF and abnormal appearing right tube on CT with PUL, cannot rule out ruptured ectopic pregnancy at this time.  A diagram was drawn for pt and partner for better understanding.  Discussed with pt diagnostic laparoscopy is strongly recommended at this time in order to remove FF and evaluate adnexa and remove fallopian tube if an ectopic pregnancy is visualized.   Discussed consequences and sequela of untreated ruptured ectopic pregnancy include but are not limited to worsening pain, hemodynamic instability, need for emergent surgery, and possible death.         Pt stated that she would not be staying to have a diagnostic laparoscopy at [facility].  Pt states that she will only do what her doctor, Dr. Ordonez recommends.  Pt stated her plan was to go home, sleep, and call Dr. Ordonez in the morning.  I informed pt that she would have to sign out against medical advice which she agreed to do.  I asked pt if she was willing to repeat CBC to see if H/H stable or if there is concern for active bleeding.       I personally spoke to covering ED attending .   Pt agreed to repeat CBC.  H/H stable 12.8/38.5-->13.2-->39.4.          I discussed with pt that the only way I could safely rule out a ruptured ectopic pregnancy was via diagnostic laparoscopy at this time. Pt p/w N/V/D/ abdominal pain and VB.  Pt had USN ~6:30 pm which showed "complex fluid adjacent to the right ovary, may be compatible with a ruptured cyst although no cyst is seen at this time".  At the time when USN was performed POC was not performed and bhcg was not ordered.     Initial labs ~6pm did not have order for bhcg, add on bhcg performed ~10pm after urine Hcg was noted to be positive.  This was when the GYN team was first consulted.  I immediately went to evaluate the pt with PGY-2.    PGY-2 called the radiology resident to inform team of +bhcg as read was performed prior to this knowledge.  The resident escalated to covering attending who stated they would addend the report to say "cannot rule out ectopic pregnancy".      At time of evaluation pt sitting at edge of bed in NAD.  Pt stated that her RLQ pain was only "1.5/10" after receiving Tylenol.  On exam, abdomen was soft, minimal tenderness to palpation in RLQ, no rebound, no guarding, VSS.  Pt with no si/sx of acute blood loss anemia.  Pt appeared frustrated stating that she has been in the ED for "many hours" before being told she was pregnant.  Pt states she is hungry and tired and she plans to go home and call Dr. Ordonez in the morning.      I discussed USN and CT findings in detail with pt and partner.  I discussed +FF and abnormal appearing right tube on CT with PUL, cannot rule out ruptured ectopic pregnancy at this time.  A diagram was drawn for pt and partner for better understanding.  Discussed with pt diagnostic laparoscopy is strongly recommended at this time in order to remove FF and evaluate adnexa and remove fallopian tube if an ectopic pregnancy is visualized.   Discussed consequences and sequela of untreated ruptured ectopic pregnancy include but are not limited to worsening pain, hemodynamic instability, need for emergent surgery, and possible death.       Pt stated that she would not be staying to have a diagnostic laparoscopy at [facility], under any circumstances.  Pt states that she will only do what her doctor, Dr. Ordonez recommends.  Pt stated her plan was to go home, sleep, and call Dr. Ordonez in the morning.  I informed pt that she would have to sign out against medical advice which she agreed to do.  I asked pt if she was willing to repeat CBC to see if H/H stable or if there is concern for active bleeding, she agreed to repeat CBC.  I advised pt that if she does sign out AMA she should go straight to [facility] where Dr. Ordonez has privileges.        I personally spoke to covering ED attending Dr. Scanlon, recommended diagnostic laparoscopy, informed him that pt declines despite counseling.  Discussed need for pt to sign out AMA.  Informed him that pt states she will repeat CBC at this time.       H/H stable 12.8/38.5-->13.2-->39.4.      Upon reading ED note it states disposition "Discharged".  I attempted to speak to Dr. Scanlon again to see if pt in fact signed out AMA as discussed.    Dr. Scanlon went home and the pt was already gone.     Pt added to [facility] GYN list for follow up.     Allyssa Hough MD Pt p/w N/V/D/ abdominal pain and VB.  Pt had USN ~6:30 pm which showed "complex fluid adjacent to the right ovary, may be compatible with a ruptured cyst although no cyst is seen at this time".  At the time when USN was performed POC was not performed and bhcg was not ordered.     Initial labs ~6pm did not have order for bhcg, add on bhcg performed ~10pm after urine Hcg was noted to be positive.  This was when the GYN team was first consulted.  I immediately went to evaluate the pt with PGY-2.    PGY-2 called the radiology resident to inform team of +bhcg as read was performed prior to this knowledge.  The resident escalated to covering attending who stated they would addend the report to say "cannot rule out ectopic pregnancy".      At time of evaluation pt sitting at edge of bed in NAD.  Pt stated that her RLQ pain was only "1.5/10" after receiving Tylenol.  On exam, abdomen was soft, minimal tenderness to palpation in RLQ, no rebound, no guarding, VSS.  Pt with no si/sx of acute blood loss anemia.  Pt appeared frustrated stating that she has been in the ED for "many hours" before being told she was pregnant.  Pt states she is hungry and tired and she plans to go home and call Dr. Ordonez in the morning.      I discussed USN and CT findings in detail with pt and partner.  I discussed +FF and abnormal appearing right tube on CT with PUL, cannot rule out ruptured ectopic pregnancy at this time.  A diagram was drawn for pt and partner for better understanding.  Discussed with pt diagnostic laparoscopy is strongly recommended at this time in order to remove FF and evaluate adnexa and remove fallopian tube if an ectopic pregnancy is visualized.   Discussed consequences and sequela of untreated ruptured ectopic pregnancy include but are not limited to worsening pain, hemodynamic instability, need for emergent surgery, and possible death.       Pt stated that she would not be staying to have a diagnostic laparoscopy at [facility], under any circumstances.  Pt states that she will only do what her doctor, Dr. Ordonez recommends.  Pt stated her plan was to go home, sleep, and call Dr. Ordonez in the morning.  I informed pt that she would have to sign out against medical advice which she agreed to do.  I asked pt if she was willing to repeat CBC to see if H/H stable or if there is concern for active bleeding, she agreed to repeat CBC.  I advised pt that if she does sign out AMA she should go straight to [facility] where Dr. Ordonez has privileges.  Precautions also given to pt and partner.       I personally spoke to covering ED attending Dr. Scanlon, recommended diagnostic laparoscopy, informed him that pt declines despite counseling.  Discussed need for pt to sign out AMA.  Informed him that pt states she will repeat CBC at this time.       H/H stable 12.8/38.5-->13.2-->39.4.      Upon reading ED note it states disposition "Discharged".  I attempted to speak to Dr. Scanlon again to see if pt in fact signed out AMA as discussed.    Dr. Scanlon went home and the pt was already gone.     Pt added to [facility] GYN list for follow up.     Allyssa Hough MD

## 2024-05-22 NOTE — ED PROVIDER NOTE - PHYSICAL EXAMINATION
Mild right lower abdominal tenderness palpation abdomen is soft patient is awake alert well-appearing ambulatory steady gait heart regular lungs clear.

## 2024-05-22 NOTE — CHART NOTE - NSCHARTNOTEFT_GEN_A_CORE
Medical consult called  as instructed by Dr Kebede (anesthesia)  due to PVC noted on monitor in PACU. patient otherwise asymptomatic  Dr Vieyra, house attending, made aware. agrees with plan  continue post op care

## 2024-05-22 NOTE — DISCHARGE NOTE PROVIDER - PROVIDER TOKENS
PROVIDER:[TOKEN:[[id number]],FOLLOWUP:[1 week],ESTABLISHEDPATIENT:[T]],PROVIDER:[TOKEN:[[id number]]] PROVIDER:[TOKEN:[[id number]],FOLLOWUP:[1 week],ESTABLISHEDPATIENT:[T]],PROVIDER:[TOKEN:[[id number]]],PROVIDER:[TOKEN:[[id number]],FOLLOWUP:[1 week]]

## 2024-05-22 NOTE — CONSULT NOTE ADULT - SUBJECTIVE AND OBJECTIVE BOX
GYN Consult Note    29y G_P_  LMP * presents with   HPI:      OB/GYN HISTORY:   Physician:   Ob:   - G1:   - G2:   Gyn: Denies fibroids, cysts, PCOS, endometriosis, or history of genital lesions   PMH: Denies  PAST MEDICAL & SURGICAL HISTORY:  No pertinent past medical history        PSH: Denies   Meds:  MEDICATIONS  (STANDING):    MEDICATIONS  (PRN):    Allergies:   Allergies    sulfa drugs (Rash)    Intolerances          REVIEW OF SYSTEMS  General: denies fevers, chills, tiredness  Skin/Breast: denies breast pain  Respiratory and Thorax: denies shortness of breath or cough  Cardiovascular: denies chest pain, palpitations  Gastrointestinal: denies abdominal pain, nausea/ vomiting	  Genitourinary: denies dysuria, increased urinary frequency, urgency, abnormal vaginal discharge,   Constitutional, Cardiovascular, Respiratory, Gastrointestinal, Genitourinary, Musculoskeletal and Integumentary review of systems are normal except as noted. 	      Vital Signs Last 24 Hrs  T(C): 36.6 (21 May 2024 23:53), Max: 36.6 (21 May 2024 23:53)  T(F): 97.8 (21 May 2024 23:53), Max: 97.8 (21 May 2024 23:53)  HR: 81 (21 May 2024 23:53) (75 - 81)  BP: 109/77 (21 May 2024 23:53) (93/60 - 115/81)  BP(mean): --  RR: 16 (21 May 2024 23:53) (16 - 17)  SpO2: 97% (21 May 2024 23:53) (97% - 99%)    Parameters below as of 21 May 2024 23:53  Patient On (Oxygen Delivery Method): room air        PHYSICAL EXAM: Chaperoned w/ RN at bedside.   Gen: NAD, alert and oriented x 3  Cardiovascular: regular   Respiratory: breathing comfortably on RA  Abd: soft, non tender, non-distended  Pelvic: closed/long, no CMT, Uterus: normal size, non tender  Adnexa: non tender, no palpable masses  Extremities: NTBL  Skin: warm and well perfused      LABS:                        12.8   13.59 )-----------( 362      ( 21 May 2024 18:03 )             38.5     05-    138  |  104  |  10  ----------------------------<  101<H>  4.1   |  20<L>  |  0.52    Ca    9.2      21 May 2024 18:03    TPro  7.6  /  Alb  4.3  /  TBili  0.5  /  DBili  x   /  AST  15  /  ALT  13  /  AlkPhos  98  05-21    PT/INR - ( 21 May 2024 18:03 )   PT: 11.7 sec;   INR: 1.05 ratio         PTT - ( 21 May 2024 18:03 )  PTT:31.0 sec  Urinalysis Basic - ( 21 May 2024 18:35 )    Color: Yellow / Appearance: Cloudy / S.023 / pH: x  Gluc: x / Ketone: Negative mg/dL  / Bili: Negative / Urobili: 0.2 mg/dL   Blood: x / Protein: Trace mg/dL / Nitrite: Negative   Leuk Esterase: Trace / RBC: 7 /HPF / WBC 6 /HPF   Sq Epi: x / Non Sq Epi: 13 /HPF / Bacteria: Many /HPF        RADIOLOGY & ADDITIONAL STUDIES:   GYN Consult Note    29yof       OB/GYN HISTORY:   Physician:   Ob:   - G1:   - G2:   Gyn: Denies fibroids, cysts, PCOS, endometriosis, or history of genital lesions   PMH: Denies  PAST MEDICAL & SURGICAL HISTORY:  No pertinent past medical history        PSH: Denies   Meds:  MEDICATIONS  (STANDING):    MEDICATIONS  (PRN):    Allergies:   Allergies    sulfa drugs (Rash)    Intolerances          REVIEW OF SYSTEMS  General: denies fevers, chills, tiredness  Skin/Breast: denies breast pain  Respiratory and Thorax: denies shortness of breath or cough  Cardiovascular: denies chest pain, palpitations  Gastrointestinal: denies abdominal pain, nausea/ vomiting	  Genitourinary: denies dysuria, increased urinary frequency, urgency, abnormal vaginal discharge,   Constitutional, Cardiovascular, Respiratory, Gastrointestinal, Genitourinary, Musculoskeletal and Integumentary review of systems are normal except as noted. 	      Vital Signs Last 24 Hrs  T(C): 36.6 (21 May 2024 23:53), Max: 36.6 (21 May 2024 23:53)  T(F): 97.8 (21 May 2024 23:53), Max: 97.8 (21 May 2024 23:53)  HR: 81 (21 May 2024 23:53) (75 - 81)  BP: 109/77 (21 May 2024 23:53) (93/60 - 115/81)  BP(mean): --  RR: 16 (21 May 2024 23:53) (16 - 17)  SpO2: 97% (21 May 2024 23:53) (97% - 99%)    Parameters below as of 21 May 2024 23:53  Patient On (Oxygen Delivery Method): room air        PHYSICAL EXAM: Chaperoned w/ RN at bedside.   Gen: NAD, alert and oriented x 3  Cardiovascular: regular   Respiratory: breathing comfortably on RA  Abd: soft, non tender, non-distended  Pelvic: closed/long, no CMT, Uterus: normal size, non tender  Adnexa: non tender, no palpable masses  Extremities: NTBL  Skin: warm and well perfused      LABS:                        12.8   13.59 )-----------( 362      ( 21 May 2024 18:03 )             38.5     05-21    138  |  104  |  10  ----------------------------<  101<H>  4.1   |  20<L>  |  0.52    Ca    9.2      21 May 2024 18:03    TPro  7.6  /  Alb  4.3  /  TBili  0.5  /  DBili  x   /  AST  15  /  ALT  13  /  AlkPhos  98  05-21    PT/INR - ( 21 May 2024 18:03 )   PT: 11.7 sec;   INR: 1.05 ratio         PTT - ( 21 May 2024 18:03 )  PTT:31.0 sec  Urinalysis Basic - ( 21 May 2024 18:35 )    Color: Yellow / Appearance: Cloudy / S.023 / pH: x  Gluc: x / Ketone: Negative mg/dL  / Bili: Negative / Urobili: 0.2 mg/dL   Blood: x / Protein: Trace mg/dL / Nitrite: Negative   Leuk Esterase: Trace / RBC: 7 /HPF / WBC 6 /HPF   Sq Epi: x / Non Sq Epi: 13 /HPF / Bacteria: Many /HPF        RADIOLOGY & ADDITIONAL STUDIES:   GYN Consult Note    30yo  @ LMP  on daily norethindrone pills who presents to the ED with R sided abdominal pain early this morning () associated with vomiting, diarrhea, and vaginal bleeding. GYN consulted for possible ruptured ectopic.  Patient states she did not have her period in March after having a blighted ovum in February of this year. She then had her period from -, and then her period was late this month, starting on May 17. Patient states she has been on Norethindrone due to "compatibility with breastfeeding". Currently rates her abdominal pain as 1-2/10 pain and is feeling primarily tired and hungry as she has not eaten since 8p on . Denies any fevers or chills.      OB/GYN HISTORY:   Physician: Dr. Ordonez  Ob:  x2 (, ), TOP x1 with medication (), blighted ovum s/p D&C (2024)  Gyn: h/o ovarian cysts, Denies fibroids, endometriosis, or history of genital lesions   PMH: Denies  PSH: D&C  Meds: Norethindrone  Allergies: sulfa drugs (Rash)        Vital Signs Last 24 Hrs  T(C): 36.6 (21 May 2024 23:53), Max: 36.6 (21 May 2024 23:53)  T(F): 97.8 (21 May 2024 23:53), Max: 97.8 (21 May 2024 23:53)  HR: 81 (21 May 2024 23:53) (75 - 81)  BP: 109/77 (21 May 2024 23:53) (93/60 - 115/81)  BP(mean): --  RR: 16 (21 May 2024 23:53) (16 - 17)  SpO2: 97% (21 May 2024 23:53) (97% - 99%)    Parameters below as of 21 May 2024 23:53  Patient On (Oxygen Delivery Method): room air        PHYSICAL EXAM: Examined with Dr. Hough at bedside.   Gen: NAD, alert and oriented x 3  Cardiovascular: regular   Respiratory: breathing comfortably on RA  Abd: soft, nondistended, minimal tenderness in RLQ, no rebound, no guarding  : patient declined  Extremities: NTBL  Skin: warm and well perfused      LABS:                        12.8   13.59 )-----------( 362      ( 21 May 2024 18:03 )             38.5         138  |  104  |  10  ----------------------------<  101<H>  4.1   |  20<L>  |  0.52    Ca    9.2      21 May 2024 18:03    TPro  7.6  /  Alb  4.3  /  TBili  0.5  /  DBili  x   /  AST  15  /  ALT  13  /  AlkPhos  98      PT/INR - ( 21 May 2024 18:03 )   PT: 11.7 sec;   INR: 1.05 ratio         PTT - ( 21 May 2024 18:03 )  PTT:31.0 sec  Urinalysis Basic - ( 21 May 2024 18:35 )    Color: Yellow / Appearance: Cloudy / S.023 / pH: x  Gluc: x / Ketone: Negative mg/dL  / Bili: Negative / Urobili: 0.2 mg/dL   Blood: x / Protein: Trace mg/dL / Nitrite: Negative   Leuk Esterase: Trace / RBC: 7 /HPF / WBC 6 /HPF   Sq Epi: x / Non Sq Epi: 13 /HPF / Bacteria: Many /HPF        RADIOLOGY & ADDITIONAL STUDIES:  < from: US Transvaginal (24 @ 18:31) >  TECHNIQUE:  Endovaginal and transabdominal pelvic sonogram. Color and Spectral   Doppler was performed.    FINDINGS:  Uterus: 6.9 cm x 3.6 cm x 4.6 cm. Within normal limits.  Endometrium: 3 mm. Within normal limits.    Right ovary: 3.4 cm x 2.1 cm x 2.3 cm. Within normal limits. Normal   arterial and venous waveforms.  Left ovary: 2.1 cm x 3.9 cm x 1.6 cm. Within normal limits. Normal   arterial and venous waveforms.    Fluid: Complex fluid adjacent to the right ovary, may be compatible with   ruptured hemorrhagic cyst.    IMPRESSION:  Complex fluid adjacent to the right ovary, may be compatible with a   ruptured cyst although no cyst is seen at this time.  No evidence of ovarian torsion    --- End of Report ---    < end of copied text >  < from: CT Abdomen and Pelvis w/ IV Cont (24 @ 19:45) >  FINDINGS:  LOWER CHEST: Lower lobe atelectasis. Right middle lobe micronodule.    LIVER: 7 mm posterior right hepatic lesion with suspected peripheral   nodular enhancement, possibly hemangioma.  BILE DUCTS: Normal caliber.  GALLBLADDER: There are nitrogen containing gallstones.  SPLEEN: Within normal limits.  PANCREAS: Within normal limits.  ADRENALS: Within normal limits.  KIDNEYS/URETERS: Within normal limits.    BLADDER: Minimally distended.  REPRODUCTIVE ORGANS: Peripherally enhancing tubular structure is noted   within the right adnexal region. Uterus is normal.    BOWEL: No bowel obstruction. Appendix is within normal limits. No   pericecal edema.  PERITONEUM: Complex pelvic fluid, as noted on pelvic ultrasound.  VESSELS: Within normal limits.  RETROPERITONEUM/LYMPH NODES: Nonspecific nonenlarged mesenteric lymph   nodes.  ABDOMINAL WALL: Tiny fat-containing umbilical hernia.  BONES: Within normal limits.    IMPRESSION:  Complex pelvic fluid, as noted on ultrasound.    Peripherally enhancing tubular structure is noted within the right   adnexal region, possibly representing a dilated fallopian tube or   ruptured cyst.        --- End of Report ---    < end of copied text >

## 2024-05-22 NOTE — H&P ADULT - HISTORY OF PRESENT ILLNESS
[facility] 345 was diagnosed ectopic pregnancy in [facility] 24    30yo F  LMP: 24     went to [facility] ed yesterday due to acute onset abd pain was told she had ruptured ov cyst and finding out [facility] was 319 suspected ruptured ectopic pregnancy based on the findings on the sonogram    pt discharged AMA and called dr rodas;s office and was told to come in today here in ED to be evaluated    pt seen w dr martel- at this time [facility] trend abnl yesterday 319 today 345 and sonogram findings still consistent as yesterday; pt c/o crampy pain low abd /10 and spotting dark red    g1: ftnsvd 2023     g2: blighted ovum: d&c done     g3 now    pmh: gallstones    psxh: d&c    meds: minipill    allergies: bactrim/ceftin: hives    sh: denies cigs/etoh/drug use

## 2024-05-22 NOTE — DISCHARGE NOTE PROVIDER - HOSPITAL COURSE
patient admitted for abdominal pain r/o ectopic pregnancy  laparoscopy right salpingectomy, removal of ectopic pregnancy and D+C performed  routine post op course  discharged home in stable condition once all milestones met     patient admitted for abdominal pain r/o ectopic pregnancy  laparoscopy right salpingectomy, removal of ectopic pregnancy and D+C performed  PVC while in PACU  medicine consult done  otherwise routine post operative  coursed  discharged home in stable condition once all milestones met     patient admitted for abdominal pain r/o ectopic pregnancy  laparoscopy right salpingectomy, removal of ectopic pregnancy and D+C performed  PVC while in PACU  medicine/cardiology consulted  otherwise routine post operative  course  discharged home in stable condition once all milestones met

## 2024-05-22 NOTE — DISCHARGE NOTE PROVIDER - NSDCCPCAREPLAN_GEN_ALL_CORE_FT
PRINCIPAL DISCHARGE DIAGNOSIS  Diagnosis: Ectopic pregnancy  Assessment and Plan of Treatment: Pelvic rest,  no sexual intercourse and nothing in vagina ( ie tampons). Motrin as needed for pain. No strenuous activity and  no heavy lifting. Keep incision clean and dry. Follow up with your gynecologist in 1 weeks for your post op visit      SECONDARY DISCHARGE DIAGNOSES  Diagnosis: Asymptomatic PVCs  Assessment and Plan of Treatment:

## 2024-05-22 NOTE — CONSULT NOTE ADULT - ATTENDING COMMENTS
29 year old woman with no medical hx of note POD#0 s/p laparoscopic right salpingectomy. Hospital Medicine consulted on account of PVC noted on heart monitor.   Pt endorses multiple episodes of palpitations that only occurs at rest when lying on her left side for close to one year. Frequency increased in recent months. No associated chest pain, SOB or syncope.  No prior work up.       Vital Signs Last 24 Hrs  T(C): 36.6 (23 May 2024 04:55), Max: 37.4 (22 May 2024 22:11)  T(F): 97.9 (23 May 2024 04:55), Max: 99.3 (22 May 2024 22:11)  HR: 80 (23 May 2024 04:55) (75 - 99)  BP: 102/62 (23 May 2024 04:55) (99/64 - 153/96)  BP(mean): 76 (22 May 2024 23:30) (76 - 83)  RR: 18 (23 May 2024 04:55) (14 - 23)  SpO2: 98% (23 May 2024 04:55) (98% - 100%)  Parameters below as of 23 May 2024 04:55  Patient On (Oxygen Delivery Method): room air    Exam unremarkable     Labs reviewed  K 3.2   trop -ve  EKG - SR ; no PVCs noted    Telemonitor with sinus tachy and suzanne  No PVCs noted    Impression   Intermittent palpitations   Etiology unclear     Plan   Continue telemetry   ECHO in AM   Cardiology consult   Check TSH and free T4  Correct low K  Check Mg levels

## 2024-05-22 NOTE — H&P ADULT - NSHPPHYSICALEXAM_GEN_ALL_CORE
Vital Signs Last 24 Hrs  T(C): 36.6 (22 May 2024 12:58), Max: 36.6 (21 May 2024 23:53)  T(F): 97.8 (22 May 2024 12:58), Max: 97.8 (21 May 2024 23:53)  HR: 86 (22 May 2024 12:58) (75 - 86)  BP: 153/96 (22 May 2024 12:58) (93/60 - 153/96)  RR: 17 (22 May 2024 12:58) (16 - 17)  SpO2: 100% (22 May 2024 12:58) (97% - 100%)    Parameters below as of 22 May 2024 12:58  Patient On (Oxygen Delivery Method): room air    pt seen w dr martel at bedside    pt alert not in acute distress    abd soft Non distended; +tenderness on low abd area no guarding no rebound    ve deferred    extrem no edema b/l

## 2024-05-22 NOTE — DISCHARGE NOTE PROVIDER - REASON FOR ADMISSION
[facility] 345 was diagnosed ectopic pregnancy in [facility] 5/21/24

## 2024-05-23 ENCOUNTER — TRANSCRIPTION ENCOUNTER (OUTPATIENT)
Age: 29
End: 2024-05-23

## 2024-05-23 VITALS
HEART RATE: 75 BPM | OXYGEN SATURATION: 100 % | DIASTOLIC BLOOD PRESSURE: 66 MMHG | TEMPERATURE: 97 F | SYSTOLIC BLOOD PRESSURE: 104 MMHG | RESPIRATION RATE: 16 BRPM

## 2024-05-23 DIAGNOSIS — I49.3 VENTRICULAR PREMATURE DEPOLARIZATION: ICD-10-CM

## 2024-05-23 LAB
MAGNESIUM SERPL-MCNC: 2.1 MG/DL — SIGNIFICANT CHANGE UP (ref 1.6–2.6)
T4 FREE SERPL-MCNC: 1.5 NG/DL — SIGNIFICANT CHANGE UP (ref 0.9–1.8)
TSH SERPL-MCNC: 1.06 UU/ML — SIGNIFICANT CHANGE UP (ref 0.34–4.82)

## 2024-05-23 PROCEDURE — 85610 PROTHROMBIN TIME: CPT

## 2024-05-23 PROCEDURE — 86900 BLOOD TYPING SEROLOGIC ABO: CPT

## 2024-05-23 PROCEDURE — 84484 ASSAY OF TROPONIN QUANT: CPT

## 2024-05-23 PROCEDURE — 84439 ASSAY OF FREE THYROXINE: CPT

## 2024-05-23 PROCEDURE — 76817 TRANSVAGINAL US OBSTETRIC: CPT

## 2024-05-23 PROCEDURE — 83735 ASSAY OF MAGNESIUM: CPT

## 2024-05-23 PROCEDURE — 99232 SBSQ HOSP IP/OBS MODERATE 35: CPT

## 2024-05-23 PROCEDURE — 85025 COMPLETE CBC W/AUTO DIFF WBC: CPT

## 2024-05-23 PROCEDURE — 36415 COLL VENOUS BLD VENIPUNCTURE: CPT

## 2024-05-23 PROCEDURE — 86850 RBC ANTIBODY SCREEN: CPT

## 2024-05-23 PROCEDURE — 86901 BLOOD TYPING SEROLOGIC RH(D): CPT

## 2024-05-23 PROCEDURE — 84443 ASSAY THYROID STIM HORMONE: CPT

## 2024-05-23 PROCEDURE — 88305 TISSUE EXAM BY PATHOLOGIST: CPT

## 2024-05-23 PROCEDURE — 93005 ELECTROCARDIOGRAM TRACING: CPT

## 2024-05-23 PROCEDURE — 99291 CRITICAL CARE FIRST HOUR: CPT

## 2024-05-23 PROCEDURE — 85730 THROMBOPLASTIN TIME PARTIAL: CPT

## 2024-05-23 PROCEDURE — 86923 COMPATIBILITY TEST ELECTRIC: CPT

## 2024-05-23 PROCEDURE — 80053 COMPREHEN METABOLIC PANEL: CPT

## 2024-05-23 PROCEDURE — 76801 OB US < 14 WKS SINGLE FETUS: CPT

## 2024-05-23 PROCEDURE — 84702 CHORIONIC GONADOTROPIN TEST: CPT

## 2024-05-23 RX ORDER — SENNOSIDES/DOCUSATE SODIUM 8.6MG-50MG
2 TABLET ORAL
Qty: 20 | Refills: 0
Start: 2024-05-23 | End: 2024-06-01

## 2024-05-23 RX ORDER — POTASSIUM CHLORIDE 20 MEQ
40 PACKET (EA) ORAL ONCE
Refills: 0 | Status: COMPLETED | OUTPATIENT
Start: 2024-05-23 | End: 2024-05-23

## 2024-05-23 RX ORDER — IBUPROFEN 200 MG
1 TABLET ORAL
Qty: 28 | Refills: 0
Start: 2024-05-23 | End: 2024-05-29

## 2024-05-23 RX ORDER — ACETAMINOPHEN 500 MG
2 TABLET ORAL
Qty: 56 | Refills: 0
Start: 2024-05-23 | End: 2024-05-29

## 2024-05-23 RX ADMIN — Medication 30 MILLIGRAM(S): at 11:47

## 2024-05-23 RX ADMIN — Medication 40 MILLIEQUIVALENT(S): at 06:44

## 2024-05-23 NOTE — PROGRESS NOTE ADULT - REASON FOR ADMISSION
[facility] 345 was diagnosed ectopic pregnancy in [facility] 5/21/24
[facility] 345 was diagnosed ectopic pregnancy in [facility] 5/21/24

## 2024-05-23 NOTE — DISCHARGE NOTE NURSING/CASE MANAGEMENT/SOCIAL WORK - NSDCVIVACCINE_GEN_ALL_CORE_FT
Tdap; 17-Sep-2018 05:20; Dave Vickers (RN); [employer]; [id number] (Exp. Date: 20-Jun-2020); IntraMuscular; Deltoid Left.; 0.5 milliLiter(s); VIS (VIS Published: 09-May-2013, VIS Presented: 17-Sep-2018);

## 2024-05-23 NOTE — PROGRESS NOTE ADULT - PROBLEM SELECTOR PLAN 1
Patient noted to have PVC's on telemetry monitoring as well as occasional palpitations as an outpatient  -GYN spoke with Dr. Dowd of cardiology who recommends outpatient follow-up for further work-up and management. Patient has Dr. Dowd's card  -If patient to remain in the hospital, would recommend continued telemetry monitoring but may follow up outpatient  -If patient to remain in the hospital, would recommend TTE but may follow up outpatient  -TSH is WNL, await FT4 level  -Magnesium is >2.0  -K repleted with 40mEq KCL

## 2024-05-23 NOTE — DISCHARGE NOTE NURSING/CASE MANAGEMENT/SOCIAL WORK - NSDCPEFALRISK_GEN_ALL_CORE
For information on Fall & Injury Prevention, visit: [email or web address] OR  [email or web address] OR  https://www.cdc.gov/steadi/patient.html

## 2024-05-23 NOTE — PROGRESS NOTE ADULT - SUBJECTIVE AND OBJECTIVE BOX
Patient seen resting comfortably.  No current complaints.  Denies HA, CP, SOB, dizziness, palpitations, worsening abdominal pain, worsening vaginal bleeding or any other concerns.  + Ambulation, + void without difficulty, + flatus and tolerating regular diet.  States no history of cardiology work up prior.     Vital Signs Last 24 Hrs  T(C): 36.6 (23 May 2024 04:55), Max: 37.4 (22 May 2024 22:11)  T(F): 97.9 (23 May 2024 04:55), Max: 99.3 (22 May 2024 22:11)  HR: 80 (23 May 2024 04:55) (75 - 99)  BP: 102/62 (23 May 2024 04:55) (99/64 - 153/96)  BP(mean): 76 (22 May 2024 23:30) (76 - 83)  RR: 18 (23 May 2024 04:55) (14 - 23)  SpO2: 98% (23 May 2024 04:55) (98% - 100%)    Parameters below as of 23 May 2024 04:55  Patient On (Oxygen Delivery Method): room air        Gen: A&O x3, NAD  Chest: CTABL  Cardiac: S1+S2+ RRR  Abdomen: Soft, nontender, +BS, nondistended, dressings clean/dry/intact  Gyn: No active bleeding  Extremities: Nontender, no worsening edema, DTRS 2+, venodynes intact bilaterally                          12.6   8.98  )-----------( 355      ( 22 May 2024 13:55 )             39.2    05-22    139  |  105  |  7   ----------------------------<  83  3.2<L>   |  31  |  0.69    Ca    9.0      22 May 2024 13:55    TPro  7.5  /  Alb  3.5  /  TBili  0.5  /  DBili  x   /  AST  9<L>  /  ALT  20  /  AlkPhos  95  05-22      A/P:  29 year old female s/p laparoscopic right salpingectomy for ectopic pregnancy and D&C.  POD #1.      - Pain management  - Advance as per protocol  -OOB and ambulate  -Cardiology consult requested  -Medicine following  -For possible discharge today.
[facility] Medicine    Patient is a 29y old  Female who presents with a chief complaint of bhcg 345 was diagnosed ectopic pregnancy in [facility] 5/21/24 (23 May 2024 07:31)      SUBJECTIVE / OVERNIGHT EVENTS:  Patient noted to have multiple runs of PVC's on telemetry. She denies any palpitations at this time but does endorse that she gets them occasionally. She denies CP, dizziness, SOB. She does report some pain at the surgical site.     MEDICATIONS  (STANDING):  lactated ringers. 1000 milliLiter(s) (125 mL/Hr) IV Continuous <Continuous>    MEDICATIONS  (PRN):  acetaminophen     Tablet .. 975 milliGRAM(s) Oral once PRN Mild Pain (1 - 3)  ondansetron    Tablet 8 milliGRAM(s) Oral every 8 hours PRN Nausea and/or Vomiting  simethicone 80 milliGRAM(s) Chew every 6 hours PRN Gas      Vital Signs Last 24 Hrs  T(C): 36.3 (05-23-24 @ 12:53)  T(F): 97.4 (05-23-24 @ 12:53), Max: 99.3 (05-22-24 @ 22:11)  HR: 75 (05-23-24 @ 12:53) (75 - 99)  BP: 104/66 (05-23-24 @ 12:53)  BP(mean): 76 (05-22-24 @ 23:30) (76 - 83)  RR: 16 (05-23-24 @ 12:53) (14 - 23)  SpO2: 100% (05-23-24 @ 12:53) (98% - 100%)  Wt(kg): --    05-22 @ 07:01  -  05-23 @ 07:00  --------------------------------------------------------  IN: 1120 mL / OUT: 1000 mL / NET: 120 mL      CAPILLARY BLOOD GLUCOSE        I&O's Summary    22 May 2024 07:01  -  23 May 2024 07:00  --------------------------------------------------------  IN: 1120 mL / OUT: 1000 mL / NET: 120 mL        PHYSICAL EXAM:  GENERAL: NAD, well-developed, lying in bed  HEAD:  Atraumatic, Normocephalic  EYES:  conjunctiva and sclera clear  NECK: Supple, No JVD  CHEST/LUNG: Clear to auscultation bilaterally; No wheeze, rhonchi, rales  HEART: Regular rate and rhythm; No murmurs, rubs, or gallops  ABDOMEN: Soft, Nontender, Nondistended; Bowel sounds present  EXTREMITIES:  2+ Peripheral Pulses, No clubbing, cyanosis, or edema  PSYCH: AAOx3, pleasant, cooperative  NEUROLOGY: non-focal  SKIN: No rashes or lesions, surgical dressings are c/d/i    LABS:                        12.6   8.98  )-----------( 355      ( 22 May 2024 13:55 )             39.2     05-22    139  |  105  |  7   ----------------------------<  83  3.2<L>   |  31  |  0.69    Ca    9.0      22 May 2024 13:55  Mg     2.1     05-23    TPro  7.5  /  Alb  3.5  /  TBili  0.5  /  DBili  x   /  AST  9<L>  /  ALT  20  /  AlkPhos  95  05-22    PT/INR - ( 22 May 2024 13:55 )   PT: 12.6 sec;   INR: 1.11 ratio         PTT - ( 22 May 2024 13:55 )  PTT:31.1 sec      Urinalysis Basic - ( 22 May 2024 13:55 )    Color: x / Appearance: x / SG: x / pH: x  Gluc: 83 mg/dL / Ketone: x  / Bili: x / Urobili: x   Blood: x / Protein: x / Nitrite: x   Leuk Esterase: x / RBC: x / WBC x   Sq Epi: x / Non Sq Epi: x / Bacteria: x        RADIOLOGY & ADDITIONAL TESTS:    Imaging Personally Reviewed:  < from: US Transvaginal, OB (05.22.24 @ 14:40) >  IMPRESSION:  Neither an intrauterine nor definite extrauterine gestation is   identified. This represents a pregnancy of indeterminate location.   Differential diagnosis includes early normal IUP, pregnancy failure or   ectopic pregnancy. Adjacent to the right ovarian parenchyma there are 2   slightly hyperechoic rounded echogenic foci, adjacent to one another   measuring 1.9 x 1.4 x 1.2 cm and 1.7 x 1.4 x 1.7 cm, similar in   appearance to prior examination 5/21/2024, indeterminate in etiology. A   small volume of free fluid is identified within the pelvic cul-de-sac   region, with debris. Serial hCG and ultrasound are recommended to   determine the significance of these findings.    < end of copied text >      Consultant(s) Notes Reviewed:  GYN    Care Discussed with Consultants/Other Providers:    Assessment and Plan:

## 2024-05-23 NOTE — PROGRESS NOTE ADULT - ASSESSMENT
30yo F PMH pf gallstones, comes in for ectopic pregnancy, now  s/p lap right salpingectomy, D and C as well, POD1. Found to have post op PVCs, medicine consulted for PVCs. Still with PVC's on telemetry.

## 2024-05-23 NOTE — DISCHARGE NOTE NURSING/CASE MANAGEMENT/SOCIAL WORK - PATIENT PORTAL LINK FT
You can access the FollowMyHealth Patient Portal offered by [facility] by registering at the following website: [email or web address]. By joining [employer]’s FollowMyHealth portal, you will also be able to view your health information using other applications (apps) compatible with our system.

## 2024-05-23 NOTE — PATIENT PROFILE ADULT - FALL HARM RISK - HARM RISK INTERVENTIONS

## 2024-05-23 NOTE — CHART NOTE - NSCHARTNOTEFT_GEN_A_CORE
Discussed patient with Dr Giron of cardiology.    Patient able to be discharged home with outpatient follow up.

## 2024-05-24 LAB — SURGICAL PATHOLOGY STUDY: SIGNIFICANT CHANGE UP

## 2024-06-20 ENCOUNTER — EMERGENCY (EMERGENCY)
Facility: HOSPITAL | Age: 29
LOS: 1 days | Discharge: HOME | End: 2024-06-20
Attending: EMERGENCY MEDICINE
Payer: MEDICAID

## 2024-06-20 VITALS
HEIGHT: 61 IN | OXYGEN SATURATION: 99 % | RESPIRATION RATE: 17 BRPM | WEIGHT: 145.51 LBS | DIASTOLIC BLOOD PRESSURE: 67 MMHG | TEMPERATURE: 98 F | SYSTOLIC BLOOD PRESSURE: 115 MMHG | HEART RATE: 90 BPM

## 2024-06-20 PROCEDURE — 99284 EMERGENCY DEPT VISIT MOD MDM: CPT

## 2024-06-20 PROCEDURE — 99283 EMERGENCY DEPT VISIT LOW MDM: CPT

## 2024-06-20 RX ORDER — DIPHENHYDRAMINE HCL 50 MG
50 CAPSULE ORAL ONCE
Refills: 0 | Status: COMPLETED | OUTPATIENT
Start: 2024-06-20 | End: 2024-06-20

## 2024-06-20 RX ORDER — FAMOTIDINE 10 MG/ML
1 INJECTION INTRAVENOUS
Qty: 7 | Refills: 0
Start: 2024-06-20 | End: 2024-06-26

## 2024-06-20 RX ORDER — FAMOTIDINE 10 MG/ML
20 INJECTION INTRAVENOUS DAILY
Refills: 0 | Status: ACTIVE | OUTPATIENT
Start: 2024-06-20 | End: 2025-05-19

## 2024-06-20 RX ADMIN — Medication 50 MILLIGRAM(S): at 20:09

## 2024-06-20 RX ADMIN — FAMOTIDINE 20 MILLIGRAM(S): 10 INJECTION INTRAVENOUS at 20:09

## 2024-06-20 NOTE — ED ADULT NURSE NOTE - OBJECTIVE STATEMENT
Pt c/o hives to chest, abdomen, arms X6 days, was seen in UC yesterday and was prescribed medications with no relief

## 2024-06-20 NOTE — ED PROVIDER NOTE - CLINICAL SUMMARY MEDICAL DECISION MAKING FREE TEXT BOX
29-year-old female no pertinent past medical history PMH ectopic pregnancy presenting to emergency department for uterine Karia x 3 days.  Patient states she went to urgent care yesterday was given Benadryl and prednisone, took initial first doses today.  States rash has continued prompting ED arrival.  No new foods or topical agents, however does note she recently started a new birth control Tri-Lo-Piedad.  Denies fever, chills, oral lesions, difficulty swallowing or tolerating secretions, other complaint.   PE as above, no airway compromise. No e/o erythema multiforme, SJS/TEN, Lyme, cellulitis, necrotizing fasciitis, no angioedema, meningococcemia, ok mountain spotted fever. possible drug reaction, advised to stop new oral contraceptive follow-up with OB/GYN, Pepcid, Benadryl, continue steroid taper.  Red flag signs and symptoms discussed as well as strict return precautions and patient verbalized understanding.

## 2024-06-20 NOTE — ED PROVIDER NOTE - PATIENT PORTAL LINK FT
You can access the FollowMyHealth Patient Portal offered by [facility] by registering at the following website: [email or web address]. By joining [employer]’s FollowMyHealth portal, you will also be able to view your health information using other applications (apps) compatible with our system.

## 2024-06-20 NOTE — ED PROVIDER NOTE - NSFOLLOWUPINSTRUCTIONS_ED_ALL_ED_FT
1) Follow up with your doctor as discussed  2) Return to the ED immediately for new or worsening symptoms chest pain, shortness of breath, inability to swallow  3) Please continue to take any home medications as prescribed  4) Your test results from your ED visit were discussed with you prior to discharge  5) You were provided with a copy of your test results  6) Continue to take 25-50mg Benadryl every 5 hours, 20mg pepcid daily , and the prednisone daily    Allergies, Adult  An allergy is a condition that causes the body's defense system (immune system) to react too strongly to an allergen. An allergen is a substance that is harmless to most people but can cause a reaction in some people.    Allergies often affect the nose (allergic rhinitis), eyes (conjunctivitis), skin (atopic dermatitis), and stomach. They can be mild, moderate, or severe. They cannot spread from person to person. Allergies can start at any age. In some cases, they may go away as you get older.    What are the causes?  Allergies are caused by allergens. These may be:  Outdoor allergens. These include pollen, car fumes, and mold.  Indoor allergens. These include dust, smoke, mold, and pet dander.  Other allergens. These include foods, medicines, scents, and insect bites or stings.  What increases the risk?  You are more likely to have allergies if you have:  Family members with allergies.  Family members who have a condition that may be caused by allergens, such as asthma.  What are the signs or symptoms?  Symptoms depend on how severe your allergy is.    Mild to moderate symptoms    Runny nose, stuffy nose (nasal congestion), or sneezing.  Itchy mouth, ears, or throat.  Postnasal drip. This is a feeling of mucus dripping down the back of your throat.  Sore throat.  Itchy, red, watery, or puffy eyes.  Skin rash, or itchy, red, swollen areas of skin (hives).  Stomach cramps or bloating.  Severe symptoms    A bad allergy to food, medicine, or insect bites may cause a severe reaction (anaphylactic reaction). Symptoms include:  A red face.  Coughing or making high-pitched whistling sounds when you breathe, most often when you breathe out (wheezing).  Swollen lips, tongue, or mouth.  A tight or swollen throat.  Chest pain or tightness, or a fast heartbeat.  Trouble breathing or shortness of breath.  Pain in your abdomen.  Vomiting or diarrhea.  Feeling dizzy or fainting.  How is this diagnosed?  Allergies are diagnosed based on your symptoms, your family and medical history, and a physical exam. You may also have tests, such as:  Skin tests. These may be done to see how your skin reacts to allergens. Tests include:  Skin prick test. For this test, the allergen is put in your body through a small prick in the skin.  Intradermal skin test. For this test, a small amount of the allergen is put under the first layer of your skin.  Patch test. For this test, a small amount of the allergen is placed on your skin. The area is covered and then checked after a few days.  Blood tests.  A challenge test. For this test, you eat or breathe in the allergen to see if you have a reaction.  You may also be asked to:  Keep a food diary. This means writing down all the foods, drinks, and symptoms you have in a day.  Try an elimination diet. To do this:  Stop eating certain foods.  Add those foods back one by one to find out if any of them cause a reaction.  How is this treated?  A person putting eye drops in an eye.  A person using nasal spray.  Treatment for allergies depends on your symptoms. It may include:  Cold, wet cloths (cold compresses). These can be used to soothe itching and swelling.  Eye drops or nasal sprays.  A saline solution to clear out your nose and keep it moist (nasal irrigation). A saline solution is made of salt and water.  A humidifier. This can add moisture to the air.  Skin creams. These can treat rashes or itching.  Diet changes to cut out foods that cause allergies.  Being exposed again and again to tiny amounts of allergens. This can help your body build a defense against them (tolerance). This process is called immunotherapy. It may be done using:  Allergy shots. This is when you get a shot of the allergen.  Sublingual immunotherapy. This is when you take a small dose of the allergen under your tongue.  Allergy medicines (antihistamines) or other medicines. These can help block the allergic reaction.  Using an auto-injector pen. An auto-injector pen is a device filled with medicine that gives an emergency shot of epinephrine. Your health care provider will teach you how to use it.  Follow these instructions at home:  Medicines    A person using an auto-injector pen in the thigh.  Take or apply over-the-counter and prescription medicines only as told by your provider.  Always carry your auto-injector pen if you are at risk of an anaphylactic reaction. Give yourself the shot as told by your provider.  Eating and drinking    Follow instructions from your provider about what you may eat and drink.  Drink enough fluid to keep your pee (urine) pale yellow.  General instructions    Wear a medical alert bracelet or necklace if you have had an anaphylactic reaction in the past.  Avoid known allergens when you can.  Keep all follow-up visits. Your provider will watch your symptoms and talk about treatment options with you.  Contact a health care provider if:  Your symptoms do not get better with treatment.  Get help right away if:  You have any symptoms of anaphylactic reaction.  You use an auto-injector pen. You will need more medical care even if the medicine seems to be working. An anaphylactic reaction may happen again within 72 hours (rebound anaphylaxis).  These symptoms may be an emergency. Use the auto-injector pen right away. Then call 911.  Do not wait to see if the symptoms will go away.  Do not drive yourself to the hospital.  This information is not intended to replace advice given to you by your health care provider. Make sure you discuss any questions you have with your health care provider.    Document Revised: 08/30/2023 Document Reviewed: 08/30/2023

## 2024-06-20 NOTE — ED PROVIDER NOTE - PHYSICAL EXAMINATION
Gen: NAD, AOx3, able to make needs known, non-toxic  Head: NCAT  HEENT: EOMI, oral mucosa moist, normal conjunctiva  Lung: CTAB, no respiratory distress, no wheezes/rhonchi/rales B/L, speaking in full sentences  CV: RRR, no murmurs  Abd: non distended, soft, nontender, no guarding, no CVA tenderness  MSK: no visible deformities  Neuro: Appears non focal  Skin: +diffuse urticaria   Psych: normal affect

## 2024-06-20 NOTE — ED PROVIDER NOTE - DISCHARGE DATE
right hip fracture requiring surgical intervention/Concern for delay in diagnosis and treatment/Other: 20-Jun-2024

## 2024-06-20 NOTE — ED ADULT TRIAGE NOTE - CHIEF COMPLAINT QUOTE
Itchy hives on chest , abd and arms x 6 days ,seen at Urgent care yesterday and prescribed banophen and prednisone with no relief

## 2024-06-20 NOTE — ED PROVIDER NOTE - OBJECTIVE STATEMENT
29-year-old female no pertinent past medical history PMH ectopic pregnancy presenting to emergency department for uterine Karia x 3 days.  Patient states she went to urgent care yesterday was given Benadryl and prednisone, took initial first doses today.  States rash has continued prompting ED arrival.  No new foods or topical agents, however does note she recently started a new birth control Tri-Lo-Piedad.  Denies fever, chills, oral lesions, difficulty swallowing or tolerating secretions, other complaint.

## 2024-07-18 ENCOUNTER — NON-APPOINTMENT (OUTPATIENT)
Age: 29
End: 2024-07-18

## 2024-12-11 ENCOUNTER — NON-APPOINTMENT (OUTPATIENT)
Age: 29
End: 2024-12-11

## 2025-01-17 ENCOUNTER — NON-APPOINTMENT (OUTPATIENT)
Age: 30
End: 2025-01-17

## 2025-02-28 ENCOUNTER — NON-APPOINTMENT (OUTPATIENT)
Age: 30
End: 2025-02-28

## 2025-03-03 ENCOUNTER — NON-APPOINTMENT (OUTPATIENT)
Age: 30
End: 2025-03-03

## 2025-05-12 ENCOUNTER — NON-APPOINTMENT (OUTPATIENT)
Age: 30
End: 2025-05-12

## 2025-05-22 ENCOUNTER — NON-APPOINTMENT (OUTPATIENT)
Age: 30
End: 2025-05-22

## (undated) DEVICE — TROCAR COVIDIEN VERSAONE BLADED FIXATION 5MM STANDARD

## (undated) DEVICE — SUT VICRYL PLUS 0 27" UR-6

## (undated) DEVICE — URETERAL CATH RED RUBBER 16FR (ORANGE)

## (undated) DEVICE — DRAPE LAVH 124" X 30" X125"

## (undated) DEVICE — BASIN SET SINGLE

## (undated) DEVICE — SOL IRR BAG NS 0.9% 1000ML

## (undated) DEVICE — TUBING STRYKEFLOW II SUCTION / IRRIGATOR

## (undated) DEVICE — D HELP - CLEARVIEW CLEARIFY SYSTEM

## (undated) DEVICE — WARMING BLANKET UPPER ADULT

## (undated) DEVICE — UTERINE MANIPULATOR COOPER SURGICAL 5MM 33CM GREEN

## (undated) DEVICE — PREP BETADINE KIT

## (undated) DEVICE — ENDOCATCH 10MM SPECIMEN POUCH

## (undated) DEVICE — TUBING STRYKER PNEUMOSURE HEATED RTP

## (undated) DEVICE — TROCAR COVIDIEN VERSAONE BLADED FIXATION 11MM STANDARD

## (undated) DEVICE — POSITIONER STIRRUP STRAP W SLIP RING 19X3.5"

## (undated) DEVICE — SUT MONOCRYL 4-0 27" PS-2 UNDYED

## (undated) DEVICE — GLV 7.5 PROTEXIS (WHITE)

## (undated) DEVICE — TUBING ASPIRATION HANDLE

## (undated) DEVICE — FOLEY TRAY 16FR SURESTEP LTX BG

## (undated) DEVICE — LAP PAD W RING 18 X 18"

## (undated) DEVICE — SOL IRR POUR H2O 500ML

## (undated) DEVICE — SOL IRR POUR NS 0.9% 1500ML

## (undated) DEVICE — SUT POLYSORB 4-0 18" P-12 UNDYED

## (undated) DEVICE — VACUUM CURETTE BUSSE HOSP CURVED 8MM

## (undated) DEVICE — TROCAR COVIDIEN VERSAPORT BLADELESS OPTICAL 12MM STANDARD

## (undated) DEVICE — PACK GENERAL LAPAROSCOPY

## (undated) DEVICE — VISITEC 4X4

## (undated) DEVICE — DRSG TELFA 3 X 8

## (undated) DEVICE — LIGASURE BLUNT TIP 37CM

## (undated) DEVICE — DRAPE LIGHT HANDLE COVER (BLUE)

## (undated) DEVICE — NDL HYPO SAFE 25G X 1.5" (ORANGE)

## (undated) DEVICE — FOR-ESU VALLEYLAB T7E15008DX: Type: DURABLE MEDICAL EQUIPMENT

## (undated) DEVICE — ELCTR GROUNDING PAD ADULT COVIDIEN

## (undated) DEVICE — GOWN XL

## (undated) DEVICE — VENODYNE/SCD SLEEVE CALF MEDIUM